# Patient Record
Sex: FEMALE | NOT HISPANIC OR LATINO | Employment: UNEMPLOYED | URBAN - METROPOLITAN AREA
[De-identification: names, ages, dates, MRNs, and addresses within clinical notes are randomized per-mention and may not be internally consistent; named-entity substitution may affect disease eponyms.]

---

## 2018-08-01 ENCOUNTER — TRANSCRIBE ORDERS (OUTPATIENT)
Dept: ADMINISTRATIVE | Facility: HOSPITAL | Age: 48
End: 2018-08-01

## 2018-08-01 DIAGNOSIS — R10.815 PERIUMBILIC ABDOMINAL TENDERNESS, REBOUND TENDERNESS PRESENCE NOT SPECIFIED: Primary | ICD-10-CM

## 2018-08-27 ENCOUNTER — HOSPITAL ENCOUNTER (OUTPATIENT)
Dept: CT IMAGING | Facility: HOSPITAL | Age: 48
Discharge: HOME | End: 2018-08-27
Attending: SURGERY | Admitting: SURGERY

## 2018-08-27 DIAGNOSIS — R10.815 PERIUMBILIC ABDOMINAL TENDERNESS, REBOUND TENDERNESS PRESENCE NOT SPECIFIED: ICD-10-CM

## 2018-08-27 PROCEDURE — 74176 CT ABD & PELVIS W/O CONTRAST: CPT

## 2019-03-13 ENCOUNTER — APPOINTMENT (OUTPATIENT)
Dept: PREADMISSION TESTING | Facility: HOSPITAL | Age: 49
End: 2019-03-13

## 2019-03-13 VITALS — BODY MASS INDEX: 29.41 KG/M2 | HEIGHT: 66 IN | WEIGHT: 182.98 LBS

## 2019-03-13 LAB
ANION GAP SERPL CALCULATED.3IONS-SCNC: 10 MMOL/L (ref 3–11)
BUN BLD-MCNC: 15 MG/DL (ref 9–23)
BUN/CREAT SERPL: 18.5 (ref 7–25)
CALCIUM SPEC-SCNC: 9.5 MG/DL (ref 8.7–10.4)
CEA SERPL-MCNC: 1 NG/ML (ref 0–2.5)
CHLORIDE SERPL-SCNC: 104 MMOL/L (ref 99–109)
CO2 SERPL-SCNC: 26 MMOL/L (ref 20–31)
CREAT BLD-MCNC: 0.81 MG/DL (ref 0.6–1.3)
DEPRECATED RDW RBC AUTO: 43.4 FL (ref 37–54)
ERYTHROCYTE [DISTWIDTH] IN BLOOD BY AUTOMATED COUNT: 12.4 % (ref 11.3–14.5)
GFR SERPL CREATININE-BSD FRML MDRD: 75 ML/MIN/1.73
GFR SERPL CREATININE-BSD FRML MDRD: 90 ML/MIN/1.73
GLUCOSE BLD-MCNC: 192 MG/DL (ref 70–100)
HCT VFR BLD AUTO: 43.7 % (ref 34.5–44)
HGB BLD-MCNC: 15.2 G/DL (ref 11.5–15.5)
MCH RBC QN AUTO: 33.2 PG (ref 27–31)
MCHC RBC AUTO-ENTMCNC: 34.8 G/DL (ref 32–36)
MCV RBC AUTO: 95.4 FL (ref 80–99)
PLATELET # BLD AUTO: 238 10*3/MM3 (ref 150–450)
PMV BLD AUTO: 10.3 FL (ref 6–12)
POTASSIUM BLD-SCNC: 5.2 MMOL/L (ref 3.5–5.5)
RBC # BLD AUTO: 4.58 10*6/MM3 (ref 3.89–5.14)
SODIUM BLD-SCNC: 140 MMOL/L (ref 132–146)
WBC NRBC COR # BLD: 7.63 10*3/MM3 (ref 3.5–10.8)

## 2019-03-13 PROCEDURE — 85027 COMPLETE CBC AUTOMATED: CPT | Performed by: SURGERY

## 2019-03-13 PROCEDURE — 82378 CARCINOEMBRYONIC ANTIGEN: CPT | Performed by: SURGERY

## 2019-03-13 PROCEDURE — 36415 COLL VENOUS BLD VENIPUNCTURE: CPT

## 2019-03-13 PROCEDURE — 80048 BASIC METABOLIC PNL TOTAL CA: CPT | Performed by: SURGERY

## 2019-03-13 RX ORDER — MELOXICAM 7.5 MG/1
15 TABLET ORAL ONCE
Status: CANCELLED | OUTPATIENT
Start: 2019-03-13 | End: 2019-03-13

## 2019-03-13 RX ORDER — ACETAMINOPHEN 500 MG
1000 TABLET ORAL ONCE
Status: CANCELLED | OUTPATIENT
Start: 2019-03-13 | End: 2019-03-13

## 2019-03-13 RX ORDER — PREGABALIN 75 MG/1
75 CAPSULE ORAL ONCE
Status: CANCELLED | OUTPATIENT
Start: 2019-03-13 | End: 2019-03-13

## 2019-03-13 RX ORDER — SCOLOPAMINE TRANSDERMAL SYSTEM 1 MG/1
1 PATCH, EXTENDED RELEASE TRANSDERMAL CONTINUOUS
Status: CANCELLED | OUTPATIENT
Start: 2019-03-13 | End: 2019-03-16

## 2019-03-13 NOTE — DISCHARGE INSTRUCTIONS
The following information and instructions were given:    Do not eat, drink, smoke or chew gum after midnight the night before surgery. This also includes no mints.  Take all routine, prescribed medications including heart and blood pressure medicines with a sip of water unless otherwise instructed by your physician.   Do NOT take diabetic medication unless instructed by your physician.    If you were instructed to drink 20 ounces (or until full) of Gatorade or G2 (if diabetic) the morning of surgery, the Gatorade or G2 must be completed 1 hour before arrival time on the day of surgery .  No RED Gatorade or G2.      DO NOT shave for two days before your procedure.  Do not wear makeup.      DO NOT wear fingernail polish (gel/regular) and/or acrylic/artificial nails on the day of surgery.   If you have had a recent manicure and would rather not remove polish or artificial nails, then the minimum requirement is that the polish/artificial nails must be removed from the middle finger on each hand.      If you are having surgery/procedure on an upper extremity, then fingernail polish/artificial fingernails must be removed for surgery.  NO EXCEPTIONS.      If you are having surgery/procedure on a lower extremity, then toenail polish on both extremities must be removed for surgery.  NO EXCEPTIONS.    Remove all jewelry (advise to go to jeweler if unable to remove).  Jewelry, especially rings, can no longer be taped for surgery.    Leave anything you consider valuable at home.    Leave your suitcase in the car until after your surgery.    Bring the following with you the day of your procedure (when applicable)       -picture ID and insurance cards       -Co-pay/deductible required by insurance       -Medications in the original bottles (not a list) including all over-the-counter medications if not brought to PAT       -Copy of advance directive, living will or power of  documents if not brought to PAT       -CPAP or  BIPAP mask and tubing (do not bring machine)       -Skin prep instruction(s) sheet       -PAT Pass    Education booklet, brochure, handout or binder related to procedure given to patient.    When applicable, an ERAS booklet was given to patient.    Pain Control After Surgery handout given to patient.    Respirex use (handout given to patient) and pneumonia prevention education provided.    Signs and Symptoms of infection discussed.    DVT Prevention education given.  Stressing the importance of ambulation.    Please apply Chlorhexadine wipes to surgical area (if instructed) the night before procedure and the AM of procedure and document date/time of applications on skin prep instruction sheet.        Per Anesthesia Request, patient instructed not to take their ACE/ARB medications on the AM of surgery.  Patient instructed to drink 20 ounces (or until full) of Gatorade or 20 ounces of G2 (if diabetic) and complete 1 hour before arrival time for procedure (NO RED Gatorade or G2)    Patient verbalized understanding.  Hernia information

## 2019-03-21 ENCOUNTER — ANESTHESIA EVENT (OUTPATIENT)
Dept: PERIOP | Facility: HOSPITAL | Age: 49
End: 2019-03-21

## 2019-03-21 ENCOUNTER — HOSPITAL ENCOUNTER (OUTPATIENT)
Facility: HOSPITAL | Age: 49
Discharge: HOME | End: 2019-03-24
Attending: SURGERY | Admitting: SURGERY

## 2019-03-21 ENCOUNTER — ANESTHESIA (OUTPATIENT)
Dept: PERIOP | Facility: HOSPITAL | Age: 49
End: 2019-03-21

## 2019-03-21 DIAGNOSIS — K43.9 VENTRAL HERNIA: ICD-10-CM

## 2019-03-21 PROBLEM — K43.2 RECURRENT INCISIONAL HERNIA: Status: ACTIVE | Noted: 2019-03-21

## 2019-03-21 LAB
GLUCOSE BLDC GLUCOMTR-MCNC: 219 MG/DL (ref 70–130)
GLUCOSE BLDC GLUCOMTR-MCNC: 238 MG/DL (ref 70–130)
HBA1C MFR BLD: 8 % (ref 4.8–5.6)

## 2019-03-21 PROCEDURE — G0378 HOSPITAL OBSERVATION PER HR: HCPCS

## 2019-03-21 PROCEDURE — C1781 MESH (IMPLANTABLE): HCPCS | Performed by: SURGERY

## 2019-03-21 PROCEDURE — 25010000002 NEOSTIGMINE 10 MG/10ML SOLUTION: Performed by: NURSE ANESTHETIST, CERTIFIED REGISTERED

## 2019-03-21 PROCEDURE — 88302 TISSUE EXAM BY PATHOLOGIST: CPT | Performed by: SURGERY

## 2019-03-21 PROCEDURE — 25010000002 PROPOFOL 1000 MG/ML EMULSION: Performed by: NURSE ANESTHETIST, CERTIFIED REGISTERED

## 2019-03-21 PROCEDURE — 25010000002 ONDANSETRON PER 1 MG: Performed by: NURSE ANESTHETIST, CERTIFIED REGISTERED

## 2019-03-21 PROCEDURE — 25010000002 PHENYLEPHRINE PER 1 ML: Performed by: NURSE ANESTHETIST, CERTIFIED REGISTERED

## 2019-03-21 PROCEDURE — 63710000001 INSULIN LISPRO (HUMAN) PER 5 UNITS: Performed by: ANESTHESIOLOGY

## 2019-03-21 PROCEDURE — 88304 TISSUE EXAM BY PATHOLOGIST: CPT | Performed by: SURGERY

## 2019-03-21 PROCEDURE — 63710000001 INSULIN LISPRO (HUMAN) PER 5 UNITS

## 2019-03-21 PROCEDURE — 25010000002 FENTANYL CITRATE (PF) 100 MCG/2ML SOLUTION: Performed by: NURSE ANESTHETIST, CERTIFIED REGISTERED

## 2019-03-21 PROCEDURE — 82962 GLUCOSE BLOOD TEST: CPT

## 2019-03-21 PROCEDURE — 25010000002 HYDROMORPHONE HCL PF 500 MG/50ML SOLUTION: Performed by: SURGERY

## 2019-03-21 PROCEDURE — 25010000002 PROPOFOL 10 MG/ML EMULSION: Performed by: NURSE ANESTHETIST, CERTIFIED REGISTERED

## 2019-03-21 PROCEDURE — 25010000002 PROMETHAZINE PER 50 MG: Performed by: NURSE ANESTHETIST, CERTIFIED REGISTERED

## 2019-03-21 PROCEDURE — 83036 HEMOGLOBIN GLYCOSYLATED A1C: CPT | Performed by: SURGERY

## 2019-03-21 PROCEDURE — 94799 UNLISTED PULMONARY SVC/PX: CPT

## 2019-03-21 PROCEDURE — 25010000003 CEFAZOLIN IN DEXTROSE 2-4 GM/100ML-% SOLUTION: Performed by: SURGERY

## 2019-03-21 DEVICE — VENTRALIGHT ST MESH
Type: IMPLANTABLE DEVICE | Site: ABDOMEN | Status: FUNCTIONAL
Brand: VENTRALIGHT ST

## 2019-03-21 RX ORDER — CEFAZOLIN SODIUM 2 G/100ML
2 INJECTION, SOLUTION INTRAVENOUS ONCE
Status: COMPLETED | OUTPATIENT
Start: 2019-03-21 | End: 2019-03-21

## 2019-03-21 RX ORDER — FENTANYL CITRATE 50 UG/ML
50 INJECTION, SOLUTION INTRAMUSCULAR; INTRAVENOUS
Status: DISCONTINUED | OUTPATIENT
Start: 2019-03-21 | End: 2019-03-21 | Stop reason: HOSPADM

## 2019-03-21 RX ORDER — MAGNESIUM HYDROXIDE 1200 MG/15ML
LIQUID ORAL AS NEEDED
Status: DISCONTINUED | OUTPATIENT
Start: 2019-03-21 | End: 2019-03-21 | Stop reason: HOSPADM

## 2019-03-21 RX ORDER — PROMETHAZINE HYDROCHLORIDE 25 MG/1
25 SUPPOSITORY RECTAL ONCE AS NEEDED
Status: COMPLETED | OUTPATIENT
Start: 2019-03-21 | End: 2019-03-21

## 2019-03-21 RX ORDER — SODIUM CHLORIDE 9 MG/ML
INJECTION, SOLUTION INTRAVENOUS CONTINUOUS PRN
Status: DISCONTINUED | OUTPATIENT
Start: 2019-03-21 | End: 2019-03-21 | Stop reason: SURG

## 2019-03-21 RX ORDER — LABETALOL HYDROCHLORIDE 5 MG/ML
5 INJECTION, SOLUTION INTRAVENOUS
Status: DISCONTINUED | OUTPATIENT
Start: 2019-03-21 | End: 2019-03-21 | Stop reason: HOSPADM

## 2019-03-21 RX ORDER — PROPOFOL 10 MG/ML
VIAL (ML) INTRAVENOUS AS NEEDED
Status: DISCONTINUED | OUTPATIENT
Start: 2019-03-21 | End: 2019-03-21 | Stop reason: SURG

## 2019-03-21 RX ORDER — MELOXICAM 7.5 MG/1
15 TABLET ORAL ONCE
Status: COMPLETED | OUTPATIENT
Start: 2019-03-21 | End: 2019-03-21

## 2019-03-21 RX ORDER — PROMETHAZINE HYDROCHLORIDE 25 MG/1
25 TABLET ORAL ONCE AS NEEDED
Status: COMPLETED | OUTPATIENT
Start: 2019-03-21 | End: 2019-03-21

## 2019-03-21 RX ORDER — ACETAMINOPHEN 500 MG
1000 TABLET ORAL ONCE
Status: COMPLETED | OUTPATIENT
Start: 2019-03-21 | End: 2019-03-21

## 2019-03-21 RX ORDER — NEOSTIGMINE METHYLSULFATE 1 MG/ML
INJECTION, SOLUTION INTRAVENOUS AS NEEDED
Status: DISCONTINUED | OUTPATIENT
Start: 2019-03-21 | End: 2019-03-21 | Stop reason: SURG

## 2019-03-21 RX ORDER — SCOLOPAMINE TRANSDERMAL SYSTEM 1 MG/1
1 PATCH, EXTENDED RELEASE TRANSDERMAL ONCE
Status: DISCONTINUED | OUTPATIENT
Start: 2019-03-21 | End: 2019-03-21

## 2019-03-21 RX ORDER — ONDANSETRON 2 MG/ML
4 INJECTION INTRAMUSCULAR; INTRAVENOUS ONCE AS NEEDED
Status: DISCONTINUED | OUTPATIENT
Start: 2019-03-21 | End: 2019-03-21 | Stop reason: HOSPADM

## 2019-03-21 RX ORDER — HYDROMORPHONE HYDROCHLORIDE 1 MG/ML
0.5 INJECTION, SOLUTION INTRAMUSCULAR; INTRAVENOUS; SUBCUTANEOUS
Status: DISCONTINUED | OUTPATIENT
Start: 2019-03-21 | End: 2019-03-21 | Stop reason: HOSPADM

## 2019-03-21 RX ORDER — MORPHINE SULFATE 4 MG/ML
2 INJECTION, SOLUTION INTRAMUSCULAR; INTRAVENOUS
Status: DISCONTINUED | OUTPATIENT
Start: 2019-03-21 | End: 2019-03-24 | Stop reason: HOSPADM

## 2019-03-21 RX ORDER — BUPIVACAINE HYDROCHLORIDE 2.5 MG/ML
INJECTION, SOLUTION EPIDURAL; INFILTRATION; INTRACAUDAL
Status: COMPLETED | OUTPATIENT
Start: 2019-03-21 | End: 2019-03-21

## 2019-03-21 RX ORDER — PROMETHAZINE HYDROCHLORIDE 25 MG/ML
6.25 INJECTION, SOLUTION INTRAMUSCULAR; INTRAVENOUS ONCE AS NEEDED
Status: COMPLETED | OUTPATIENT
Start: 2019-03-21 | End: 2019-03-21

## 2019-03-21 RX ORDER — EPHEDRINE SULFATE 50 MG/ML
5 INJECTION, SOLUTION INTRAVENOUS ONCE AS NEEDED
Status: DISCONTINUED | OUTPATIENT
Start: 2019-03-21 | End: 2019-03-21 | Stop reason: HOSPADM

## 2019-03-21 RX ORDER — LIDOCAINE HYDROCHLORIDE 10 MG/ML
0.5 INJECTION, SOLUTION EPIDURAL; INFILTRATION; INTRACAUDAL; PERINEURAL ONCE AS NEEDED
Status: COMPLETED | OUTPATIENT
Start: 2019-03-21 | End: 2019-03-21

## 2019-03-21 RX ORDER — SODIUM CHLORIDE, SODIUM LACTATE, POTASSIUM CHLORIDE, CALCIUM CHLORIDE 600; 310; 30; 20 MG/100ML; MG/100ML; MG/100ML; MG/100ML
150 INJECTION, SOLUTION INTRAVENOUS CONTINUOUS
Status: DISCONTINUED | OUTPATIENT
Start: 2019-03-21 | End: 2019-03-22

## 2019-03-21 RX ORDER — HYDRALAZINE HYDROCHLORIDE 20 MG/ML
5 INJECTION INTRAMUSCULAR; INTRAVENOUS
Status: DISCONTINUED | OUTPATIENT
Start: 2019-03-21 | End: 2019-03-21 | Stop reason: HOSPADM

## 2019-03-21 RX ORDER — NALOXONE HCL 0.4 MG/ML
0.4 VIAL (ML) INJECTION
Status: DISCONTINUED | OUTPATIENT
Start: 2019-03-21 | End: 2019-03-24 | Stop reason: HOSPADM

## 2019-03-21 RX ORDER — ONDANSETRON 2 MG/ML
INJECTION INTRAMUSCULAR; INTRAVENOUS AS NEEDED
Status: DISCONTINUED | OUTPATIENT
Start: 2019-03-21 | End: 2019-03-21 | Stop reason: SURG

## 2019-03-21 RX ORDER — SODIUM CHLORIDE 0.9 % (FLUSH) 0.9 %
3 SYRINGE (ML) INJECTION EVERY 12 HOURS SCHEDULED
Status: DISCONTINUED | OUTPATIENT
Start: 2019-03-21 | End: 2019-03-21 | Stop reason: HOSPADM

## 2019-03-21 RX ORDER — HEPARIN SODIUM 5000 [USP'U]/ML
5000 INJECTION, SOLUTION INTRAVENOUS; SUBCUTANEOUS EVERY 8 HOURS SCHEDULED
Status: DISCONTINUED | OUTPATIENT
Start: 2019-03-22 | End: 2019-03-24 | Stop reason: HOSPADM

## 2019-03-21 RX ORDER — NALOXONE HCL 0.4 MG/ML
0.1 VIAL (ML) INJECTION
Status: DISCONTINUED | OUTPATIENT
Start: 2019-03-21 | End: 2019-03-24 | Stop reason: HOSPADM

## 2019-03-21 RX ORDER — GLYCOPYRROLATE 0.2 MG/ML
INJECTION INTRAMUSCULAR; INTRAVENOUS AS NEEDED
Status: DISCONTINUED | OUTPATIENT
Start: 2019-03-21 | End: 2019-03-21 | Stop reason: SURG

## 2019-03-21 RX ORDER — FAMOTIDINE 20 MG/1
20 TABLET, FILM COATED ORAL
Status: COMPLETED | OUTPATIENT
Start: 2019-03-21 | End: 2019-03-21

## 2019-03-21 RX ORDER — SODIUM CHLORIDE 0.9 % (FLUSH) 0.9 %
3-10 SYRINGE (ML) INJECTION AS NEEDED
Status: DISCONTINUED | OUTPATIENT
Start: 2019-03-21 | End: 2019-03-21 | Stop reason: HOSPADM

## 2019-03-21 RX ORDER — PROMETHAZINE HYDROCHLORIDE 25 MG/ML
12.5 INJECTION, SOLUTION INTRAMUSCULAR; INTRAVENOUS
Status: DISCONTINUED | OUTPATIENT
Start: 2019-03-21 | End: 2019-03-24 | Stop reason: HOSPADM

## 2019-03-21 RX ORDER — DIPHENHYDRAMINE HYDROCHLORIDE 50 MG/ML
25 INJECTION INTRAMUSCULAR; INTRAVENOUS EVERY 6 HOURS PRN
Status: DISCONTINUED | OUTPATIENT
Start: 2019-03-21 | End: 2019-03-24 | Stop reason: HOSPADM

## 2019-03-21 RX ORDER — ATRACURIUM BESYLATE 10 MG/ML
INJECTION, SOLUTION INTRAVENOUS AS NEEDED
Status: DISCONTINUED | OUTPATIENT
Start: 2019-03-21 | End: 2019-03-21 | Stop reason: SURG

## 2019-03-21 RX ORDER — SODIUM CHLORIDE, SODIUM LACTATE, POTASSIUM CHLORIDE, CALCIUM CHLORIDE 600; 310; 30; 20 MG/100ML; MG/100ML; MG/100ML; MG/100ML
9 INJECTION, SOLUTION INTRAVENOUS CONTINUOUS PRN
Status: DISCONTINUED | OUTPATIENT
Start: 2019-03-21 | End: 2019-03-21

## 2019-03-21 RX ORDER — PREGABALIN 75 MG/1
75 CAPSULE ORAL ONCE
Status: COMPLETED | OUTPATIENT
Start: 2019-03-21 | End: 2019-03-21

## 2019-03-21 RX ORDER — LIDOCAINE HYDROCHLORIDE 10 MG/ML
INJECTION, SOLUTION EPIDURAL; INFILTRATION; INTRACAUDAL; PERINEURAL AS NEEDED
Status: DISCONTINUED | OUTPATIENT
Start: 2019-03-21 | End: 2019-03-21 | Stop reason: SURG

## 2019-03-21 RX ORDER — ALBUTEROL SULFATE 2.5 MG/3ML
2.5 SOLUTION RESPIRATORY (INHALATION) ONCE AS NEEDED
Status: DISCONTINUED | OUTPATIENT
Start: 2019-03-21 | End: 2019-03-21 | Stop reason: HOSPADM

## 2019-03-21 RX ORDER — DOCUSATE SODIUM 100 MG/1
100 CAPSULE, LIQUID FILLED ORAL 2 TIMES DAILY
Status: DISCONTINUED | OUTPATIENT
Start: 2019-03-21 | End: 2019-03-24 | Stop reason: HOSPADM

## 2019-03-21 RX ORDER — FENTANYL CITRATE 50 UG/ML
INJECTION, SOLUTION INTRAMUSCULAR; INTRAVENOUS AS NEEDED
Status: DISCONTINUED | OUTPATIENT
Start: 2019-03-21 | End: 2019-03-21 | Stop reason: SURG

## 2019-03-21 RX ADMIN — ACETAMINOPHEN 1000 MG: 500 TABLET ORAL at 07:05

## 2019-03-21 RX ADMIN — SODIUM CHLORIDE: 9 INJECTION, SOLUTION INTRAVENOUS at 09:24

## 2019-03-21 RX ADMIN — GLYCOPYRROLATE 0.4 MG: 0.2 INJECTION, SOLUTION INTRAMUSCULAR; INTRAVENOUS at 09:45

## 2019-03-21 RX ADMIN — CEFAZOLIN SODIUM 2 G: 2 INJECTION, SOLUTION INTRAVENOUS at 08:11

## 2019-03-21 RX ADMIN — ATRACURIUM BESYLATE 40 MG: 10 INJECTION, SOLUTION INTRAVENOUS at 08:07

## 2019-03-21 RX ADMIN — FENTANYL CITRATE 50 MCG: 50 INJECTION, SOLUTION INTRAMUSCULAR; INTRAVENOUS at 08:07

## 2019-03-21 RX ADMIN — FAMOTIDINE 20 MG: 20 TABLET ORAL at 07:05

## 2019-03-21 RX ADMIN — MELOXICAM 15 MG: 7.5 TABLET ORAL at 07:05

## 2019-03-21 RX ADMIN — EPHEDRINE SULFATE 10 MG: 50 INJECTION INTRAMUSCULAR; INTRAVENOUS; SUBCUTANEOUS at 08:54

## 2019-03-21 RX ADMIN — LIDOCAINE HYDROCHLORIDE 50 MG: 10 INJECTION, SOLUTION EPIDURAL; INFILTRATION; INTRACAUDAL; PERINEURAL at 08:07

## 2019-03-21 RX ADMIN — SODIUM CHLORIDE, POTASSIUM CHLORIDE, SODIUM LACTATE AND CALCIUM CHLORIDE 150 ML/HR: 600; 310; 30; 20 INJECTION, SOLUTION INTRAVENOUS at 16:04

## 2019-03-21 RX ADMIN — FENTANYL CITRATE 50 MCG: 50 INJECTION, SOLUTION INTRAMUSCULAR; INTRAVENOUS at 10:05

## 2019-03-21 RX ADMIN — LIDOCAINE HYDROCHLORIDE 0.2 ML: 10 INJECTION, SOLUTION EPIDURAL; INFILTRATION; INTRACAUDAL; PERINEURAL at 06:58

## 2019-03-21 RX ADMIN — EPHEDRINE SULFATE 10 MG: 50 INJECTION INTRAMUSCULAR; INTRAVENOUS; SUBCUTANEOUS at 08:13

## 2019-03-21 RX ADMIN — EPHEDRINE SULFATE 10 MG: 50 INJECTION INTRAMUSCULAR; INTRAVENOUS; SUBCUTANEOUS at 08:21

## 2019-03-21 RX ADMIN — PREGABALIN 75 MG: 75 CAPSULE ORAL at 07:05

## 2019-03-21 RX ADMIN — PROPOFOL 200 MG: 10 INJECTION, EMULSION INTRAVENOUS at 08:07

## 2019-03-21 RX ADMIN — EPHEDRINE SULFATE 10 MG: 50 INJECTION INTRAMUSCULAR; INTRAVENOUS; SUBCUTANEOUS at 08:27

## 2019-03-21 RX ADMIN — SODIUM CHLORIDE, POTASSIUM CHLORIDE, SODIUM LACTATE AND CALCIUM CHLORIDE 9 ML/HR: 600; 310; 30; 20 INJECTION, SOLUTION INTRAVENOUS at 06:58

## 2019-03-21 RX ADMIN — DOCUSATE SODIUM 100 MG: 100 CAPSULE, LIQUID FILLED ORAL at 20:27

## 2019-03-21 RX ADMIN — BUPIVACAINE HYDROCHLORIDE 60 ML: 2.5 INJECTION, SOLUTION EPIDURAL; INFILTRATION; INTRACAUDAL; PERINEURAL at 08:11

## 2019-03-21 RX ADMIN — ONDANSETRON 4 MG: 2 INJECTION INTRAMUSCULAR; INTRAVENOUS at 09:42

## 2019-03-21 RX ADMIN — PROPOFOL 25 MCG/KG/MIN: 10 INJECTION, EMULSION INTRAVENOUS at 08:11

## 2019-03-21 RX ADMIN — HYDROMORPHONE HYDROCHLORIDE: 10 INJECTION, SOLUTION INTRAMUSCULAR; INTRAVENOUS; SUBCUTANEOUS at 10:19

## 2019-03-21 RX ADMIN — INSULIN LISPRO 4 UNITS: 100 INJECTION, SOLUTION INTRAVENOUS; SUBCUTANEOUS at 10:51

## 2019-03-21 RX ADMIN — PHENYLEPHRINE HYDROCHLORIDE 80 MCG: 10 INJECTION INTRAVENOUS at 09:40

## 2019-03-21 RX ADMIN — PROMETHAZINE HYDROCHLORIDE 6.25 MG: 25 INJECTION INTRAMUSCULAR; INTRAVENOUS at 11:05

## 2019-03-21 RX ADMIN — ATRACURIUM BESYLATE 10 MG: 10 INJECTION, SOLUTION INTRAVENOUS at 09:04

## 2019-03-21 RX ADMIN — SCOPALAMINE 1 PATCH: 1 PATCH, EXTENDED RELEASE TRANSDERMAL at 07:05

## 2019-03-21 RX ADMIN — NEOSTIGMINE METHYLSULFATE 3 MG: 1 INJECTION, SOLUTION INTRAVENOUS at 09:45

## 2019-03-21 NOTE — H&P
"Pre-Op H&P  Angeles Viera  [id number]  1970    Chief complaint: Abdominal pain/bulge at umbilicus    HPI:    Patient is a 49 y.o.female who presents with history of open umbilical hernia repair with mesh with Dr. Florian Nov 2017.  Now has recurring abdominal pain with bulge at umbilicus with incisional hernia.  Here today for open incisional hernia repair with mesh.    Review of Systems:  General ROS: negative for chills, fever or skin lesions;  No changes since last office visit  Cardiovascular ROS: no chest pain or dyspnea on exertion  Respiratory ROS: no cough, shortness of breath, or wheezing    Allergies: No Known Allergies    Home Meds:    No current facility-administered medications on file prior to encounter.      No current outpatient medications on file prior to encounter.       PMH:   Past Medical History:   Diagnosis Date   • Abdominal hernia    • Diabetes mellitus (CMS/HCC)     pre diabetic, dieting controlled and using cpd oil     PSH:    Past Surgical History:   Procedure Laterality Date   • APPENDECTOMY     • COLONOSCOPY     • FOOT SURGERY Left    • HERNIA REPAIR      abdominal   • HYSTERECTOMY     • TONSILLECTOMY       Social History:   Tobacco:   Social History     Tobacco Use   Smoking Status Never Smoker   Smokeless Tobacco Never Used      Alcohol:     Social History     Substance and Sexual Activity   Alcohol Use Yes    Comment: occastional       Vitals:           BP (!) 154/103 (BP Location: Right arm, Patient Position: Lying)   Pulse 89   Temp 98.2 °F (36.8 °C) (Temporal)   Resp 18   Ht 167.6 cm (66\")   Wt 83 kg (182 lb 15.7 oz)   SpO2 94%   BMI 29.53 kg/m²     Physical Exam:  General Appearance:    Alert, cooperative, no distress, appears stated age   Head:    Normocephalic, without obvious abnormality, atraumatic   Lungs:     Clear to auscultation bilaterally, respirations unlabored    Heart:   Regular rate and rhythm, S1 and S2 normal, no murmur, rub    or gallop    Abdomen:  "   Soft, non-tender.  +bowel sounds.  +incisional hernia at umbilicus   Breast Exam:    deferred   Genitalia:    deferred   Extremities:   Extremities normal, atraumatic, no cyanosis or edema   Skin:   Skin color, texture, turgor normal, no rashes or lesions   Neurologic:   Grossly intact   Results Review  I reviewed the patient's new clinical results.    Cancer Staging (if applicable)  Cancer Patient: __ yes _x_no __unknown; If yes, clinical stage T:__ N:__M:__, stage group or __N/A    Impression: Incisional hernia s/p open umbilical hernia repair with mesh 2017    Plan: Open incisional hernia repair with mesh    Jackie Fairchild, APRN   3/21/2019   7:11 AM

## 2019-03-21 NOTE — ANESTHESIA POSTPROCEDURE EVALUATION
Patient: Angeles Viera    Procedure Summary     Date:  03/21/19 Room / Location:   [facility] /  [facility]    Anesthesia Start:  0801 Anesthesia Stop:      Procedure:  OPEN INCISIONAL HERNIA REPAIR WITH MESH, bilateral SEPERATION Of COMPONENTS (N/A Abdomen) Diagnosis:      Surgeon:  Daniel Cullen MD Provider:  Tj Gordon MD    Anesthesia Type:  general with block ASA Status:  2          Anesthesia Type: general with block  Last vitals  BP   112/66 (03/21/19 1001)   Temp   97.7 °F (36.5 °C) (03/21/19 1001)   Pulse   104 (03/21/19 1001)   Resp   18 (03/21/19 1001)     SpO2   93 % (03/21/19 1001)     Post Anesthesia Care and Evaluation    Patient location during evaluation: PACU  Patient participation: complete - patient participated  Level of consciousness: awake and alert  Pain score: 0  Pain management: adequate  Airway patency: patent  Anesthetic complications: No anesthetic complications  PONV Status: none  Cardiovascular status: hemodynamically stable and acceptable  Respiratory status: nonlabored ventilation, acceptable and nasal cannula  Hydration status: acceptable

## 2019-03-21 NOTE — ANESTHESIA PREPROCEDURE EVALUATION
Anesthesia Evaluation     Patient summary reviewed and Nursing notes reviewed   no history of anesthetic complications:  NPO Solid Status: > 8 hours  NPO Liquid Status: > 2 hours           Airway   Mallampati: I  TM distance: >3 FB  Neck ROM: full  No difficulty expected  Dental - normal exam     Pulmonary     breath sounds clear to auscultation  Cardiovascular   Exercise tolerance: good (4-7 METS)    Rhythm: regular  Rate: normal        Neuro/Psych  GI/Hepatic/Renal/Endo    (+) obesity,   diabetes mellitus type 2 well controlled,     Musculoskeletal     Abdominal   (+) obese,     Abdomen: soft.   Substance History      OB/GYN          Other   (+) arthritis                     Anesthesia Plan    ASA 2     general with block     intravenous induction   Anesthetic plan, all risks, benefits, and alternatives have been provided, discussed and informed consent has been obtained with: patient.    Plan discussed with CRNA.

## 2019-03-21 NOTE — BRIEF OP NOTE
VENTRAL/INCISIONAL HERNIA REPAIR  Progress Note    Angeles Viera  3/21/2019    Pre-op Diagnosis:   Recurrent incisional hernia       Post-Op Diagnosis Codes:   Same    Procedure/CPT® Codes:      Procedure(s):  OPEN INCISIONAL HERNIA REPAIR WITH MESH, bilateral SEPERATION Of COMPONENTS    Surgeon(s):  Daniel Cullen MD    Anesthesia: General with Block    Staff:   Circulator: Ginna Herman RN; Cintia Watson RN  Scrub Person: Mariam Campuzano RN  Nursing Assistant: Alethea Abebe    Estimated Blood Loss: 20 mL    Urine Voided: * No values recorded between 3/21/2019  8:00 AM and 3/21/2019  9:52 AM *    Specimens:                Specimens     ID Source Type Tests Collected By Collected At Frozen?      A Umbilicus Tissue · TISSUE PATHOLOGY EXAM   Daniel Cullen MD 3/21/19 0870 No     Description: UMBILICUS    B Abdominal Wall Tissue · TISSUE PATHOLOGY EXAM   Daniel Cullen MD 3/21/19 0806 No     Description: OLD MESH                Drains:   Closed/Suction Drain 1 Left Abdomen Bulb 10 Fr. (Active)       Closed/Suction Drain 2 Right Abdomen Bulb 10 Fr. (Active)       Findings:   1. Complex 5x5cm recurrent incisional hernia with displaced old mesh.  2. Repair with preperitoneal, retrofascial placement of 14x8 cm Bard Ventralight ST mesh, and fascia closed primarily over top.  3. Bilateral separation of component parts.    Complications: None      Daniel Cullen MD     Date: 3/21/2019  Time: 9:55 AM

## 2019-03-21 NOTE — PROGRESS NOTES
"Patient Name:  Angeles Viera  YOB: 1970  [id number]    Surgery Post - Operative Note    Date of visit: 3/21/2019    Subjective   Subjective: Feels OK, pain controlled. Voiding.       Objective     Objective:    /73 (BP Location: Right arm, Patient Position: Lying)   Pulse 104   Temp 97.8 °F (36.6 °C) (Oral)   Resp 16   Ht 167.6 cm (66\")   Wt 83 kg (182 lb 15.7 oz)   SpO2 93%   BMI 29.53 kg/m²     CV:  Rate  regular and rhythm  regular  L:  Clear  to auscultation bilaterally   ABD:  Soft, appropriately tender. Dressings  clean, dry and intact , BRIDGER's serosanguinous  EXT:  No cyanosis, clubbing or edema         Assessment/Plan     Assessment/ Plan: Doing well after Incisional hernia repair with mesh. Continue Pulmonary toilet    Hospital Problem List     Recurrent incisional hernia              Daniel Cullen MD  3/21/2019  6:22 PM    "

## 2019-03-21 NOTE — OP NOTE
OPERATIVE NOTE    Patient Name:  Angeles Viera  YOB: 1970  [id number]    Date of Surgery:  3/21/2019      PREOPERATIVE DIAGNOSIS: Recurrent incisional hernia      POSTOPERATIVE DIAGNOSIS: Same        PROCEDURE PERFORMED:   1. Open incisional hernia repair with mesh  2. Bilateral separation of components       SURGEON: Daniel Cullen MD       ASSISTANT: None        SPECIMENS:   1. Old umbilicus  2. Previous mesh       ANESTHESIA: General.        FINDINGS:   1. Complex 5x5cm recurrent incisional hernia with displaced old mesh.  2. Repair with preperitoneal, retrofascial placement of 14x8 cm Bard Ventralight ST mesh, and fascia closed primarily over top.  3. Bilateral separation of component parts.         INDICATIONS: The patient is a 49 y.o. female who presented with a recurrent incisional hernia.  She had a previous attempted repair with mesh.  The risks and benefits of open incisional hernia repair with mesh, bilateral separation of components were discussed at length with the patient and her family, and they agreed to proceed.       DESCRIPTION OF PROCEDURE:      After obtaining informed consent, the patient was taken to the operating room and placed in supine  position. After appropriate DVT and antibiotic prophylaxis, general anesthesia was induced. TAP blocks were placed by the anesthesia staff bilaterally to aid in post-op pain control . The abdomen  was prepped and draped in standard sterile fashion and covered with an Ioban dressing to prevent contact of mesh with the skin.  An elliptical incision encompassing the umbilicus was then made.  Electrocautery dissection was carried down to the subcutaneous tissues.  The umbilicus was resected and passed off as specimen.  Additional blunt and left cardia dissection was carried down to the hernia sac itself.  This was opened at its distal end and contain only some omental fat.  The fascia circumferentially was cleared.  This was a complex  "hernia with a \"Swiss cheese\" type surrounding the previously placed mesh which was displaced and is part of the hernia sac.  Using meticulous dissection, a preperitoneal plane was developed circumferentially.  The fascial edges were then cleared.  The hernia sac was opened further and the old mesh resected and passed off as specimen.  There was some omentum within the hernia sac which was reduced. At no point was bowel encountered. At this point, the hernia sac itself was closed using running Vicryl suture.  This completely sealed off the intra-abdominal contents from the preperitoneal space.  The preperitoneal space was then developed circumferentially around the fascial defect itself.    The fascial defect was 5 x 5 cm.  However, due to the patient's obesity and body habitus, it was felt that primary closure or simple mesh closure would be an adequate.  Therefore the decision was made to proceed with a bilateral separation of component parts.    Starting on the right side, the skin was elevated off of the fascia and carried laterally.  The aponeurosis of the external oblique musculature was then divided longitudinally lateral to the rectus muscle.  This extended cephalad and caudad.  It was further elevated and the separation of components was carried laterally out to the anterior superior iliac spine.  This allowed for full mobilization of the right side.    In similar fashion, the skin on the left side was elevated off of the fashion carried laterally.  The aponeurosis of the external with muscular tear was then divided longitudinally lateral to the rectus muscles on the left.  This separation was extended cephalad and caudad.  It was further elevated and separation components was carried laterally out to the anterior superior iliac spine on the left.  In this manner the left side was completely mobilized.    Given the 5 x 5 cm defect, a 14 x 8 cm piece of ventral light ST mesh was chosen.  It was placed in a " retro-fascial preperitoneal location, and affixed using trans-fascial sutures of 0 Vicryl.  This allowed for at least 4 cm of overlap in all directions.  The fascia was then closed anterior to this without tension using running looped PDS sutures.  This allowed for full closure of the hernia with mesh reinforcement.  The subcutaneous pockets were each irrigated with saline until clear, and there is no bleeding seen.  2 BRIDGER drains were placed in the subcutaneous spaces, one in the left, and one on the right.  There were brought out through separate stab incisions.  Each drain was secured using nylon sutures.    The midline wound was then closed using absorbable sutures in the deep layers and the skin was closed using skin staples.  The incision was dressed in standard sterile fashion, covered with dry sterile dressing.  The BRIDGER drains were placed to bulb suction, and dressed in standard sterile fashion as well.  An abdominal binder was placed.    All sponge and needle counts were correct times two at the completion of the procedure. The patient recovered from anesthesia, was extubated in the operating room  and was transported to the PACU  in stable condition.      Daniel Cullen MD  3/21/2019  10:04 AM      Please note that portions of this note were completed with a voice recognition program. Efforts were made to edit the dictations, but occasionally words are mistranscribed.

## 2019-03-21 NOTE — ANESTHESIA PROCEDURE NOTES
Airway  Urgency: elective    Airway not difficult    General Information and Staff    Patient location during procedure: OR  CRNA: Sherly Mariano CRNA    Indications and Patient Condition  Indications for airway management: airway protection    Preoxygenated: yes  MILS not maintained throughout  Mask difficulty assessment: 2 - vent by mask + OA or adjuvant +/- NMBA    Final Airway Details  Final airway type: endotracheal airway      Successful airway: ETT  Cuffed: yes   Successful intubation technique: direct laryngoscopy  Endotracheal tube insertion site: oral  Blade: Negrita  Blade size: 3  ETT size (mm): 7.0  Cormack-Lehane Classification: grade IIa - partial view of glottis  Placement verified by: chest auscultation and capnometry   Cuff volume (mL): 7  Measured from: lips  ETT to lips (cm): 23  Number of attempts at approach: 1    Additional Comments  Negative epigastric sounds, Breath sound equal bilaterally with symmetric chest rise and fall

## 2019-03-21 NOTE — ANESTHESIA PROCEDURE NOTES
Peripheral Block      Patient location during procedure: OR  Start time: 3/21/2019 8:05 AM  Stop time: 3/21/2019 8:09 AM  Reason for block: at surgeon's request and post-op pain management  Performed by  Anesthesiologist: Tj Gordon MD  Preanesthetic Checklist  Completed: patient identified, site marked, surgical consent, pre-op evaluation, timeout performed, IV checked, risks and benefits discussed and monitors and equipment checked  Prep:  Pt Position: supine  Sterile barriers:cap, gloves, sterile barriers and mask  Prep: ChloraPrep  Patient monitoring: blood pressure monitoring, continuous pulse oximetry and EKG  Procedure  Sedation:yes  Performed under: general  Guidance:ultrasound guided  Images:still images obtained    Laterality:Bilateral  Block Type:TAP  Injection Technique:single-shot  Needle Type:short-bevel and echogenic  Needle Gauge:20 G    Medications Used: bupivacaine PF (MARCAINE) 0.25 % injection, 60 mL  Medications  Comment:Block Injection:  LA dose divided between Right and Left block       Adjuncts:  Decadron 4mg PSF, Buprenex 0.3mg (Per total volume of LA)    Post Assessment  Injection Assessment: negative aspiration for heme, incremental injection and no paresthesia on injection  Patient Tolerance:comfortable throughout block  Complications:no  Additional Notes      Under Ultrasound guidance, a BBraun 4inch 360 degree needle was advanced with Normal Saline hydro dissection of tissue.  The Internal Oblique and Transversus Abdominus muscles where visualized.  At or before the aponeurosis of Internal Oblique, local anesthetic spread was visualized in the Transversus Abdominus Plane. Injection was made incrementally with aspiration every 5 mls.  There was no  intravascular injection,  injection pressure was normal, there was no neural injection, and the procedure was completed without difficulty.  Thank You.

## 2019-03-21 NOTE — PLAN OF CARE
Problem: Patient Care Overview  Goal: Plan of Care Review  Outcome: Ongoing (interventions implemented as appropriate)   03/21/19 1640   Coping/Psychosocial   Plan of Care Reviewed With patient   OTHER   Outcome Summary Patient arrived to the floor from the post op after an incisional hernia reapir. Denies having any pain upon arrival PCA pump in place. Educated patient on the use of her IS. Incision CDI with ABD pads and abdominal binder

## 2019-03-22 PROBLEM — E11.69 DIABETES MELLITUS TYPE 2 IN OBESE (HCC): Status: ACTIVE | Noted: 2019-03-22

## 2019-03-22 PROBLEM — F41.9 ANXIETY: Status: ACTIVE | Noted: 2019-03-22

## 2019-03-22 PROBLEM — E66.9 DIABETES MELLITUS TYPE 2 IN OBESE (HCC): Status: ACTIVE | Noted: 2019-03-22

## 2019-03-22 LAB
ANION GAP SERPL CALCULATED.3IONS-SCNC: 8 MMOL/L (ref 3–11)
BASOPHILS # BLD AUTO: 0.05 10*3/MM3 (ref 0–0.2)
BASOPHILS NFR BLD AUTO: 0.3 % (ref 0–1)
BUN BLD-MCNC: 15 MG/DL (ref 9–23)
BUN/CREAT SERPL: 21.7 (ref 7–25)
CALCIUM SPEC-SCNC: 8.9 MG/DL (ref 8.7–10.4)
CHLORIDE SERPL-SCNC: 104 MMOL/L (ref 99–109)
CO2 SERPL-SCNC: 24 MMOL/L (ref 20–31)
CREAT BLD-MCNC: 0.69 MG/DL (ref 0.6–1.3)
CYTO UR: NORMAL
DEPRECATED RDW RBC AUTO: 46.1 FL (ref 37–54)
EOSINOPHIL # BLD AUTO: 0.04 10*3/MM3 (ref 0–0.3)
EOSINOPHIL NFR BLD AUTO: 0.3 % (ref 0–3)
ERYTHROCYTE [DISTWIDTH] IN BLOOD BY AUTOMATED COUNT: 12.8 % (ref 11.3–14.5)
GFR SERPL CREATININE-BSD FRML MDRD: 109 ML/MIN/1.73
GFR SERPL CREATININE-BSD FRML MDRD: 90 ML/MIN/1.73
GLUCOSE BLD-MCNC: 169 MG/DL (ref 70–100)
GLUCOSE BLDC GLUCOMTR-MCNC: 177 MG/DL (ref 70–130)
GLUCOSE BLDC GLUCOMTR-MCNC: 187 MG/DL (ref 70–130)
GLUCOSE BLDC GLUCOMTR-MCNC: 201 MG/DL (ref 70–130)
GLUCOSE BLDC GLUCOMTR-MCNC: 230 MG/DL (ref 70–130)
HCT VFR BLD AUTO: 37.8 % (ref 34.5–44)
HGB BLD-MCNC: 12.4 G/DL (ref 11.5–15.5)
IMM GRANULOCYTES # BLD AUTO: 0.04 10*3/MM3 (ref 0–0.05)
IMM GRANULOCYTES NFR BLD AUTO: 0.3 % (ref 0–0.6)
LAB AP CASE REPORT: NORMAL
LAB AP CLINICAL INFORMATION: NORMAL
LYMPHOCYTES # BLD AUTO: 2.7 10*3/MM3 (ref 0.6–4.8)
LYMPHOCYTES NFR BLD AUTO: 18.8 % (ref 24–44)
MCH RBC QN AUTO: 32.4 PG (ref 27–31)
MCHC RBC AUTO-ENTMCNC: 32.8 G/DL (ref 32–36)
MCV RBC AUTO: 98.7 FL (ref 80–99)
MONOCYTES # BLD AUTO: 0.93 10*3/MM3 (ref 0–1)
MONOCYTES NFR BLD AUTO: 6.5 % (ref 0–12)
NEUTROPHILS # BLD AUTO: 10.62 10*3/MM3 (ref 1.5–8.3)
NEUTROPHILS NFR BLD AUTO: 74.1 % (ref 41–71)
PATH REPORT.FINAL DX SPEC: NORMAL
PATH REPORT.GROSS SPEC: NORMAL
PLATELET # BLD AUTO: 238 10*3/MM3 (ref 150–450)
PMV BLD AUTO: 10.5 FL (ref 6–12)
POTASSIUM BLD-SCNC: 4.6 MMOL/L (ref 3.5–5.5)
RBC # BLD AUTO: 3.83 10*6/MM3 (ref 3.89–5.14)
SODIUM BLD-SCNC: 136 MMOL/L (ref 132–146)
WBC NRBC COR # BLD: 14.34 10*3/MM3 (ref 3.5–10.8)

## 2019-03-22 PROCEDURE — G0108 DIAB MANAGE TRN  PER INDIV: HCPCS | Performed by: REGISTERED NURSE

## 2019-03-22 PROCEDURE — 25010000002 HEPARIN (PORCINE) PER 1000 UNITS: Performed by: SURGERY

## 2019-03-22 PROCEDURE — 25010000002 MORPHINE PER 10 MG: Performed by: SURGERY

## 2019-03-22 PROCEDURE — 85025 COMPLETE CBC W/AUTO DIFF WBC: CPT | Performed by: SURGERY

## 2019-03-22 PROCEDURE — 94799 UNLISTED PULMONARY SVC/PX: CPT

## 2019-03-22 PROCEDURE — 82962 GLUCOSE BLOOD TEST: CPT

## 2019-03-22 PROCEDURE — 80048 BASIC METABOLIC PNL TOTAL CA: CPT | Performed by: SURGERY

## 2019-03-22 PROCEDURE — 63710000001 INSULIN LISPRO (HUMAN) PER 5 UNITS: Performed by: SURGERY

## 2019-03-22 PROCEDURE — 94640 AIRWAY INHALATION TREATMENT: CPT

## 2019-03-22 PROCEDURE — G0378 HOSPITAL OBSERVATION PER HR: HCPCS

## 2019-03-22 RX ORDER — DEXTROSE, SODIUM CHLORIDE, AND POTASSIUM CHLORIDE 5; .45; .15 G/100ML; G/100ML; G/100ML
50 INJECTION INTRAVENOUS CONTINUOUS
Status: DISCONTINUED | OUTPATIENT
Start: 2019-03-22 | End: 2019-03-24 | Stop reason: HOSPADM

## 2019-03-22 RX ORDER — NICOTINE POLACRILEX 4 MG
15 LOZENGE BUCCAL
Status: DISCONTINUED | OUTPATIENT
Start: 2019-03-22 | End: 2019-03-24 | Stop reason: HOSPADM

## 2019-03-22 RX ORDER — ALPRAZOLAM 0.25 MG/1
0.25 TABLET ORAL 3 TIMES DAILY PRN
Status: DISCONTINUED | OUTPATIENT
Start: 2019-03-22 | End: 2019-03-24 | Stop reason: HOSPADM

## 2019-03-22 RX ORDER — DEXTROSE MONOHYDRATE 25 G/50ML
25 INJECTION, SOLUTION INTRAVENOUS
Status: DISCONTINUED | OUTPATIENT
Start: 2019-03-22 | End: 2019-03-24 | Stop reason: HOSPADM

## 2019-03-22 RX ORDER — ALBUTEROL SULFATE 2.5 MG/3ML
2.5 SOLUTION RESPIRATORY (INHALATION) 4 TIMES DAILY PRN
Status: DISCONTINUED | OUTPATIENT
Start: 2019-03-22 | End: 2019-03-24 | Stop reason: HOSPADM

## 2019-03-22 RX ADMIN — INSULIN LISPRO 3 UNITS: 100 INJECTION, SOLUTION INTRAVENOUS; SUBCUTANEOUS at 18:23

## 2019-03-22 RX ADMIN — INSULIN LISPRO 3 UNITS: 100 INJECTION, SOLUTION INTRAVENOUS; SUBCUTANEOUS at 20:32

## 2019-03-22 RX ADMIN — HEPARIN SODIUM 5000 UNITS: 5000 INJECTION, SOLUTION INTRAVENOUS; SUBCUTANEOUS at 21:58

## 2019-03-22 RX ADMIN — POTASSIUM CHLORIDE, DEXTROSE MONOHYDRATE AND SODIUM CHLORIDE 75 ML/HR: 150; 5; 450 INJECTION, SOLUTION INTRAVENOUS at 08:56

## 2019-03-22 RX ADMIN — INSULIN LISPRO 2 UNITS: 100 INJECTION, SOLUTION INTRAVENOUS; SUBCUTANEOUS at 13:33

## 2019-03-22 RX ADMIN — INSULIN LISPRO 2 UNITS: 100 INJECTION, SOLUTION INTRAVENOUS; SUBCUTANEOUS at 08:57

## 2019-03-22 RX ADMIN — POTASSIUM CHLORIDE, DEXTROSE MONOHYDRATE AND SODIUM CHLORIDE 75 ML/HR: 150; 5; 450 INJECTION, SOLUTION INTRAVENOUS at 22:13

## 2019-03-22 RX ADMIN — DOCUSATE SODIUM 100 MG: 100 CAPSULE, LIQUID FILLED ORAL at 20:32

## 2019-03-22 RX ADMIN — ALBUTEROL SULFATE 2.5 MG: 2.5 SOLUTION RESPIRATORY (INHALATION) at 17:29

## 2019-03-22 RX ADMIN — HEPARIN SODIUM 5000 UNITS: 5000 INJECTION, SOLUTION INTRAVENOUS; SUBCUTANEOUS at 13:33

## 2019-03-22 RX ADMIN — HEPARIN SODIUM 5000 UNITS: 5000 INJECTION, SOLUTION INTRAVENOUS; SUBCUTANEOUS at 05:18

## 2019-03-22 RX ADMIN — MORPHINE SULFATE 2 MG: 4 INJECTION INTRAVENOUS at 22:13

## 2019-03-22 RX ADMIN — DOCUSATE SODIUM 100 MG: 100 CAPSULE, LIQUID FILLED ORAL at 08:57

## 2019-03-22 NOTE — PLAN OF CARE
Problem: Patient Care Overview  Goal: Plan of Care Review  Outcome: Ongoing (interventions implemented as appropriate)   03/22/19 4424   Coping/Psychosocial   Plan of Care Reviewed With patient   OTHER   Outcome Summary pt ambulated in room; vss; dilaudid pca in use; BRIDGER drains set to suction; alert and oriented; tolerating po well; vss. abd binder still in place and dressing with small drainage; voiding well. xanax ordered prn for panic attacks-had one today but improved with ambulating in hallway.   Plan of Care Review   Progress improving     Goal: Individualization and Mutuality  Outcome: Ongoing (interventions implemented as appropriate)    Goal: Discharge Needs Assessment  Outcome: Ongoing (interventions implemented as appropriate)    Goal: Interprofessional Rounds/Family Conf  Outcome: Ongoing (interventions implemented as appropriate)      Problem: Surgery Nonspecified (Adult)  Goal: Signs and Symptoms of Listed Potential Problems Will be Absent, Minimized or Managed (Surgery Nonspecified)  Outcome: Ongoing (interventions implemented as appropriate)    Goal: Anesthesia/Sedation Recovery  Outcome: Ongoing (interventions implemented as appropriate)

## 2019-03-22 NOTE — PLAN OF CARE
Problem: Patient Care Overview  Goal: Plan of Care Review  Outcome: Ongoing (interventions implemented as appropriate)   03/22/19 0444   Coping/Psychosocial   Plan of Care Reviewed With patient;spouse   OTHER   Outcome Summary Pt VSS. Abd binder in place, coverderm dressings in place, one small area of shadow drainage. Pt has 2 BRIDGER drains to low wall suction, BRIDGER lines stripped per order. Pt splints with a pillow. Ambulates with 1,rw,gb. Pt has Dilaudid PCA pump for pain. Pt tolerated sandwich box. Rested well.    Plan of Care Review   Progress no change      03/22/19 0444   Coping/Psychosocial   Plan of Care Reviewed With patient;spouse   OTHER   Outcome Summary Pt VSS. Abd binder in place, coverderm dressings in place, one small area of shadow drainage. Pt has 2 BRIDGER drains to low wall suction, BRIDGER lines stripped per order. Pt splints with a pillow. Ambulates with 1,rw,gb. Pt has Dilaudid PCA pump for pain. Pt tolerated sandwich box. Rested well.    Plan of Care Review   Progress no change       Problem: Surgery Nonspecified (Adult)  Goal: Signs and Symptoms of Listed Potential Problems Will be Absent, Minimized or Managed (Surgery Nonspecified)  Outcome: Ongoing (interventions implemented as appropriate)    Goal: Anesthesia/Sedation Recovery  Outcome: Ongoing (interventions implemented as appropriate)

## 2019-03-22 NOTE — PROGRESS NOTES
"Patient Name:  Angeles Viera  YOB: 1970  [id number]    Surgery Progress Note    Date of visit: 3/22/2019    Subjective   Subjective: Feels sore, but tolerating PO.         Objective     Objective:     /66 (BP Location: Left arm, Patient Position: Lying)   Pulse 77   Temp 98.5 °F (36.9 °C) (Temporal)   Resp 14   Ht 167.6 cm (66\")   Wt 83 kg (182 lb 15.7 oz)   SpO2 94%   BMI 29.53 kg/m²     Intake/Output Summary (Last 24 hours) at 3/22/2019 0649  Last data filed at 3/22/2019 0346  Gross per 24 hour   Intake 1200 ml   Output 60 ml   Net 1140 ml       CV:  Rhythm  regular and rate regular   L:  Clear  to auscultation bilaterally   Abd:  Bowel sounds positive , soft, appropriately tender. Dressing c/d/i. BRIDGER's serosanguinous  Ext:  No cyanosis, clubbing, edema    Recent labs that are back at this time have been reviewed.        Assessment/Plan     Assessment/ Plan:    Hospital Problem List     Recurrent incisional hernia - Doing well after repair. Up in chair, increase mobility. Work on pain control.      DM - Sliding scale        Daniel Cullen MD  3/22/2019  6:49 AM      "

## 2019-03-22 NOTE — CONSULTS
"Diabetes Education  Assessment/Teaching    Patient Name:  Angeles Viera  YOB: 1970  MRN: [id number]  Admit Date:  3/21/2019      Assessment Date:  3/22/2019    Most Recent Value   General Information    Referral From:  A1c, Blood glucose, MD order   Height  167.6 cm (66\")   Height Method  Stated   Weight  83 kg (182 lb 15.7 oz)   Pregnancy Assessment   Diabetes History   What type of diabetes do you have?  Type 2   Length of Diabetes Diagnosis  1 - 5 years   Current DM knowledge  poor   Have you had diabetes education/teaching in the past?  no   Do you test your blood sugar at home?  yes   Frequency of checks  occ   Who performs the test?  self   Have you had low blood sugar? (<70mg/dl)  no   Have you had high blood sugar? (>140mg/dl)  yes   How often do you have high blood sugar?  -- [she was unaware of recommended glucose goals]   Education Preferences   Nutrition Information   When was the last time you saw a dietician?  never   Are you currently following a special meal plan?  -- [was just not eating anything white she said]   Assessment Topics   Healthy Eating - Assessment  Needs education   Being Active - Assessment  Needs education   Taking Medication - Assessment  Needs education   Problem Solving - Assessment  Needs education   Reducing Risk - Assessment  Needs education   Healthy Coping - Assessment  Needs education   Monitoring - Assessment  Needs education   DM Goals            Most Recent Value   DM Education Needs   Meter  Has own   Frequency of Testing  -- [urged her to test at least 3-5 x week]   Blood Glucose Target  -- [provided and discussed ADA diagnostic criteria and glucose and A1C suggested goals]   Medication  Oral [discussed oral diabetes meds available]   Problem Solving  Hypoglycemia, Hyperglycemia, Sick days, Signs, Symptoms, Treatment, Other (comment) [wound healing]   Physical Activity  None   Physical Activity Frequency  Discussed exercise importance   Healthy Coping "  Appropriate   Discharge Plan  Home   Motivation  Engaged, Strong   Teaching Method  Explanation   Patient Response  Verbalized understanding            Other Comments:   Discussed and taught Angeles about type 2 diabetes self-management, risk factors, and importance of blood glucose control to reduce complications and diagnostic criteria. Target blood glucose readings and A1c goals per ADA were reviewed. Reviewed with patient current A1c and discussed its significance. Signs, symptoms and treatment of hyperglycemia and hypoglycemia were discussed. Lifestyle changes such as physical activity with MD approval and healthy eating were encouraged. Stressed the importance of strict blood sugar control after surgery to prevent complications such as infection and to promote healing of incision.  Pt instructed to  check blood sugar at least twice per day and to call PCP if  Blood glucose is higher than 180 two times or more.  Patient was encouraged to keep record of blood glucose readings to take to follow up appointment with PCP.  She said she has known her glucoses have been elevated for several years and did at one time take metformin. She stopped the metformin due to GI upset and switched herself to cannabis oil for her glucoses. Provided handout of oral diabetes medications and urged her to discuss with her PCP. She said she was instructed to not eat anything white for her meal plan and explained the color of food has nothing to do with it; a diabetes diet is not temporary and is lowfat, high fiber and consistent CHO.  Pt was offered a free op follow up appointment however declined at this time.  Provided the OP program brochure and urged her to consider.        Electronically signed by:  Dena Rollins RN  03/22/19 1:56 PM

## 2019-03-22 NOTE — PROGRESS NOTES
Discharge Planning Assessment  [facility]     Patient Name: Angeles Veira  MRN: [id number]  Today's Date: 3/22/2019    Admit Date: 3/21/2019    Discharge Needs Assessment     Row Name 03/22/19 1404       Living Environment    Lives With  spouse    Current Living Arrangements  home/apartment/condo    Primary Care Provided by  self    Provides Primary Care For  no one    Family Caregiver if Needed  spouse    Able to Return to Prior Arrangements  yes       Transition Planning    Patient/Family Anticipates Transition to  home    Transportation Anticipated  family or friend will provide       Discharge Needs Assessment    Equipment Currently Used at Home  none        Discharge Plan     Row Name 03/22/19 1404       Plan    Plan  Home    Patient/Family in Agreement with Plan  yes    Plan Comments  Spoke with patient and  at bedside. They live in a two story in [facility]. PTA she was independent with ADL's and mobility. She denies any DME/HH needs at this time. Plan is home. CM following.     Final Discharge Disposition Code  01 - home or self-care        Destination      No service coordination in this encounter.      Durable Medical Equipment      No service coordination in this encounter.      Dialysis/Infusion      No service coordination in this encounter.      Home Medical Care      No service coordination in this encounter.      Community Resources      No service coordination in this encounter.          Demographic Summary     Row Name 03/22/19 1403       General Information    Arrived From  home    Reason for Consult  discharge planning        Functional Status     Row Name 03/22/19 1404       Functional Status    Usual Activity Tolerance  good    Current Activity Tolerance  good        Psychosocial    No documentation.       Abuse/Neglect    No documentation.       Legal    No documentation.       Substance Abuse    No documentation.       Patient Forms    No documentation.           Sneha  Luis RN

## 2019-03-23 LAB
ANION GAP SERPL CALCULATED.3IONS-SCNC: 8 MMOL/L (ref 3–11)
BUN BLD-MCNC: 8 MG/DL (ref 9–23)
BUN/CREAT SERPL: 12.5 (ref 7–25)
CALCIUM SPEC-SCNC: 8.6 MG/DL (ref 8.7–10.4)
CHLORIDE SERPL-SCNC: 105 MMOL/L (ref 99–109)
CO2 SERPL-SCNC: 26 MMOL/L (ref 20–31)
CREAT BLD-MCNC: 0.64 MG/DL (ref 0.6–1.3)
DEPRECATED RDW RBC AUTO: 46.5 FL (ref 37–54)
ERYTHROCYTE [DISTWIDTH] IN BLOOD BY AUTOMATED COUNT: 13 % (ref 11.3–14.5)
GFR SERPL CREATININE-BSD FRML MDRD: 119 ML/MIN/1.73
GFR SERPL CREATININE-BSD FRML MDRD: 98 ML/MIN/1.73
GLUCOSE BLD-MCNC: 200 MG/DL (ref 70–100)
GLUCOSE BLDC GLUCOMTR-MCNC: 153 MG/DL (ref 70–130)
GLUCOSE BLDC GLUCOMTR-MCNC: 158 MG/DL (ref 70–130)
GLUCOSE BLDC GLUCOMTR-MCNC: 163 MG/DL (ref 70–130)
GLUCOSE BLDC GLUCOMTR-MCNC: 188 MG/DL (ref 70–130)
GLUCOSE BLDC GLUCOMTR-MCNC: 194 MG/DL (ref 70–130)
HCT VFR BLD AUTO: 37.4 % (ref 34.5–44)
HGB BLD-MCNC: 12.3 G/DL (ref 11.5–15.5)
MCH RBC QN AUTO: 32.5 PG (ref 27–31)
MCHC RBC AUTO-ENTMCNC: 32.9 G/DL (ref 32–36)
MCV RBC AUTO: 98.9 FL (ref 80–99)
PLATELET # BLD AUTO: 228 10*3/MM3 (ref 150–450)
PMV BLD AUTO: 10.7 FL (ref 6–12)
POTASSIUM BLD-SCNC: 4.1 MMOL/L (ref 3.5–5.5)
RBC # BLD AUTO: 3.78 10*6/MM3 (ref 3.89–5.14)
SODIUM BLD-SCNC: 139 MMOL/L (ref 132–146)
WBC NRBC COR # BLD: 13.26 10*3/MM3 (ref 3.5–10.8)

## 2019-03-23 PROCEDURE — 25010000002 METOCLOPRAMIDE PER 10 MG: Performed by: SURGERY

## 2019-03-23 PROCEDURE — 82962 GLUCOSE BLOOD TEST: CPT

## 2019-03-23 PROCEDURE — 25010000002 HEPARIN (PORCINE) PER 1000 UNITS: Performed by: SURGERY

## 2019-03-23 PROCEDURE — G0378 HOSPITAL OBSERVATION PER HR: HCPCS

## 2019-03-23 PROCEDURE — 63710000001 INSULIN LISPRO (HUMAN) PER 5 UNITS: Performed by: SURGERY

## 2019-03-23 PROCEDURE — 94799 UNLISTED PULMONARY SVC/PX: CPT

## 2019-03-23 PROCEDURE — 80048 BASIC METABOLIC PNL TOTAL CA: CPT | Performed by: SURGERY

## 2019-03-23 PROCEDURE — 85027 COMPLETE CBC AUTOMATED: CPT | Performed by: SURGERY

## 2019-03-23 RX ORDER — OXYCODONE AND ACETAMINOPHEN 10; 325 MG/1; MG/1
1 TABLET ORAL EVERY 4 HOURS PRN
Status: DISCONTINUED | OUTPATIENT
Start: 2019-03-23 | End: 2019-03-24 | Stop reason: HOSPADM

## 2019-03-23 RX ORDER — METOCLOPRAMIDE HYDROCHLORIDE 5 MG/ML
10 INJECTION INTRAMUSCULAR; INTRAVENOUS EVERY 6 HOURS
Status: DISCONTINUED | OUTPATIENT
Start: 2019-03-23 | End: 2019-03-24 | Stop reason: HOSPADM

## 2019-03-23 RX ORDER — OXYCODONE HYDROCHLORIDE AND ACETAMINOPHEN 5; 325 MG/1; MG/1
1 TABLET ORAL EVERY 4 HOURS PRN
Status: DISCONTINUED | OUTPATIENT
Start: 2019-03-23 | End: 2019-03-24 | Stop reason: HOSPADM

## 2019-03-23 RX ORDER — BISACODYL 10 MG
10 SUPPOSITORY, RECTAL RECTAL EVERY 8 HOURS
Status: DISCONTINUED | OUTPATIENT
Start: 2019-03-23 | End: 2019-03-24 | Stop reason: HOSPADM

## 2019-03-23 RX ADMIN — Medication 10 MG: at 18:02

## 2019-03-23 RX ADMIN — METOCLOPRAMIDE 10 MG: 5 INJECTION, SOLUTION INTRAMUSCULAR; INTRAVENOUS at 16:42

## 2019-03-23 RX ADMIN — HEPARIN SODIUM 5000 UNITS: 5000 INJECTION, SOLUTION INTRAVENOUS; SUBCUTANEOUS at 21:16

## 2019-03-23 RX ADMIN — OXYCODONE AND ACETAMINOPHEN 1 TABLET: 5; 325 TABLET ORAL at 21:16

## 2019-03-23 RX ADMIN — POTASSIUM CHLORIDE, DEXTROSE MONOHYDRATE AND SODIUM CHLORIDE 50 ML/HR: 150; 5; 450 INJECTION, SOLUTION INTRAVENOUS at 18:18

## 2019-03-23 RX ADMIN — OXYCODONE AND ACETAMINOPHEN 1 TABLET: 5; 325 TABLET ORAL at 14:29

## 2019-03-23 RX ADMIN — SODIUM CHLORIDE, POTASSIUM CHLORIDE, SODIUM LACTATE AND CALCIUM CHLORIDE 1000 ML: 600; 310; 30; 20 INJECTION, SOLUTION INTRAVENOUS at 03:12

## 2019-03-23 RX ADMIN — Medication 10 MG: at 11:03

## 2019-03-23 RX ADMIN — METOCLOPRAMIDE 10 MG: 5 INJECTION, SOLUTION INTRAMUSCULAR; INTRAVENOUS at 11:03

## 2019-03-23 RX ADMIN — METOCLOPRAMIDE 10 MG: 5 INJECTION, SOLUTION INTRAMUSCULAR; INTRAVENOUS at 21:16

## 2019-03-23 RX ADMIN — INSULIN LISPRO 2 UNITS: 100 INJECTION, SOLUTION INTRAVENOUS; SUBCUTANEOUS at 18:02

## 2019-03-23 RX ADMIN — INSULIN LISPRO 2 UNITS: 100 INJECTION, SOLUTION INTRAVENOUS; SUBCUTANEOUS at 21:16

## 2019-03-23 RX ADMIN — HEPARIN SODIUM 5000 UNITS: 5000 INJECTION, SOLUTION INTRAVENOUS; SUBCUTANEOUS at 05:23

## 2019-03-23 RX ADMIN — INSULIN LISPRO 2 UNITS: 100 INJECTION, SOLUTION INTRAVENOUS; SUBCUTANEOUS at 08:31

## 2019-03-23 RX ADMIN — OXYCODONE AND ACETAMINOPHEN 1 TABLET: 5; 325 TABLET ORAL at 08:31

## 2019-03-23 RX ADMIN — OXYCODONE AND ACETAMINOPHEN 1 TABLET: 5; 325 TABLET ORAL at 03:11

## 2019-03-23 RX ADMIN — DOCUSATE SODIUM 100 MG: 100 CAPSULE, LIQUID FILLED ORAL at 08:31

## 2019-03-23 RX ADMIN — HEPARIN SODIUM 5000 UNITS: 5000 INJECTION, SOLUTION INTRAVENOUS; SUBCUTANEOUS at 14:29

## 2019-03-23 RX ADMIN — INSULIN LISPRO 2 UNITS: 100 INJECTION, SOLUTION INTRAVENOUS; SUBCUTANEOUS at 12:32

## 2019-03-23 NOTE — PLAN OF CARE
Problem: Patient Care Overview  Goal: Plan of Care Review  Outcome: Ongoing (interventions implemented as appropriate)   03/23/19 5920   Coping/Psychosocial   Plan of Care Reviewed With patient   OTHER   Outcome Summary vss-temp is lowering; IS encouraged; HR in low 100's; dressing changed on incision; had a small, small bowel movement; pain managed with percocet; alert and oriented. up in chair for a while during the day.   Plan of Care Review   Progress improving     Goal: Individualization and Mutuality  Outcome: Ongoing (interventions implemented as appropriate)    Goal: Discharge Needs Assessment  Outcome: Ongoing (interventions implemented as appropriate)    Goal: Interprofessional Rounds/Family Conf  Outcome: Ongoing (interventions implemented as appropriate)      Problem: Surgery Nonspecified (Adult)  Goal: Signs and Symptoms of Listed Potential Problems Will be Absent, Minimized or Managed (Surgery Nonspecified)  Outcome: Ongoing (interventions implemented as appropriate)    Goal: Anesthesia/Sedation Recovery  Outcome: Ongoing (interventions implemented as appropriate)

## 2019-03-23 NOTE — PROGRESS NOTES
"Patient Name:  Angeles Viera  YOB: 1970  [id number]    Surgery Progress Note    Date of visit: 3/23/2019    Subjective   Subjective: Feeling a bit better. Pain control better, voiding, tolerating PO. No BM yet, but has a history of chronic constipation.         Objective     Objective:     /79 (BP Location: Left arm, Patient Position: Lying)   Pulse 104   Temp 99.1 °F (37.3 °C) (Oral)   Resp 20   Ht 167.6 cm (66\")   Wt 83 kg (182 lb 15.7 oz)   SpO2 90%   BMI 29.53 kg/m²     Intake/Output Summary (Last 24 hours) at 3/23/2019 1000  Last data filed at 3/23/2019 0837  Gross per 24 hour   Intake 2069 ml   Output --   Net 2069 ml       CV:  Rhythm  regular and rate regular   L:  Clear  to auscultation bilaterally   Abd:  Bowel sounds positive , soft, appropriately tender. Incision c/d/i. BRIDGER's serous  Ext:  No cyanosis, clubbing, edema    Recent labs that are back at this time have been reviewed.        Assessment/Plan     Assessment/ Plan:    Hospital Problem List     Recurrent incisional hernia - Doing well after repair. D/C PCA, increase mobility. Add more bowel regimen. Follow drain output, hopefully home in 1-2 days.    Anxiety    Diabetes mellitus type 2 in obese (CMS/[facility])                  Daniel Cullen MD  3/23/2019  10:00 AM      "

## 2019-03-23 NOTE — PLAN OF CARE
Problem: Patient Care Overview  Goal: Plan of Care Review  Outcome: Ongoing (interventions implemented as appropriate)   03/23/19 0510   Coping/Psychosocial   Plan of Care Reviewed With patient;spouse   OTHER   Outcome Summary Pt VSS. Pt HR and temp elevated. Dr. Cullen notified and bolus given and Percocet added for pain. Pt c/o pain in abdomen PCA pump and PO meds seem to help some. BRIDGER drains in place to low wall suction. Abd binder in place. Dressins intact. Pt has hypoactive bowel sounds and has not passed gas, pt states she feels like she could if she bears down but she does not want to r/t pain. Pt splints abdomen with a pillow. Ambulates well to bathroom. Cont. to monitor.    Plan of Care Review   Progress no change       Problem: Surgery Nonspecified (Adult)  Goal: Signs and Symptoms of Listed Potential Problems Will be Absent, Minimized or Managed (Surgery Nonspecified)  Outcome: Ongoing (interventions implemented as appropriate)    Goal: Anesthesia/Sedation Recovery  Outcome: Ongoing (interventions implemented as appropriate)

## 2019-03-24 VITALS
RESPIRATION RATE: 16 BRPM | WEIGHT: 182.98 LBS | DIASTOLIC BLOOD PRESSURE: 75 MMHG | BODY MASS INDEX: 29.41 KG/M2 | SYSTOLIC BLOOD PRESSURE: 124 MMHG | OXYGEN SATURATION: 94 % | HEART RATE: 96 BPM | HEIGHT: 66 IN | TEMPERATURE: 98.6 F

## 2019-03-24 LAB
ANION GAP SERPL CALCULATED.3IONS-SCNC: 7 MMOL/L (ref 3–11)
BUN BLD-MCNC: 7 MG/DL (ref 9–23)
BUN/CREAT SERPL: 12.1 (ref 7–25)
CALCIUM SPEC-SCNC: 8.6 MG/DL (ref 8.7–10.4)
CHLORIDE SERPL-SCNC: 105 MMOL/L (ref 99–109)
CO2 SERPL-SCNC: 26 MMOL/L (ref 20–31)
CREAT BLD-MCNC: 0.58 MG/DL (ref 0.6–1.3)
DEPRECATED RDW RBC AUTO: 45.1 FL (ref 37–54)
ERYTHROCYTE [DISTWIDTH] IN BLOOD BY AUTOMATED COUNT: 12.6 % (ref 11.3–14.5)
GFR SERPL CREATININE-BSD FRML MDRD: 110 ML/MIN/1.73
GFR SERPL CREATININE-BSD FRML MDRD: 133 ML/MIN/1.73
GLUCOSE BLD-MCNC: 160 MG/DL (ref 70–100)
GLUCOSE BLDC GLUCOMTR-MCNC: 154 MG/DL (ref 70–130)
HCT VFR BLD AUTO: 36.3 % (ref 34.5–44)
HGB BLD-MCNC: 12 G/DL (ref 11.5–15.5)
MCH RBC QN AUTO: 32.5 PG (ref 27–31)
MCHC RBC AUTO-ENTMCNC: 33.1 G/DL (ref 32–36)
MCV RBC AUTO: 98.4 FL (ref 80–99)
PLATELET # BLD AUTO: 211 10*3/MM3 (ref 150–450)
PMV BLD AUTO: 10.6 FL (ref 6–12)
POTASSIUM BLD-SCNC: 3.6 MMOL/L (ref 3.5–5.5)
RBC # BLD AUTO: 3.69 10*6/MM3 (ref 3.89–5.14)
SODIUM BLD-SCNC: 138 MMOL/L (ref 132–146)
WBC NRBC COR # BLD: 9.49 10*3/MM3 (ref 3.5–10.8)

## 2019-03-24 PROCEDURE — 85027 COMPLETE CBC AUTOMATED: CPT | Performed by: SURGERY

## 2019-03-24 PROCEDURE — G0378 HOSPITAL OBSERVATION PER HR: HCPCS

## 2019-03-24 PROCEDURE — 94799 UNLISTED PULMONARY SVC/PX: CPT

## 2019-03-24 PROCEDURE — 25010000002 HEPARIN (PORCINE) PER 1000 UNITS: Performed by: SURGERY

## 2019-03-24 PROCEDURE — 80048 BASIC METABOLIC PNL TOTAL CA: CPT | Performed by: SURGERY

## 2019-03-24 PROCEDURE — 25010000002 METOCLOPRAMIDE PER 10 MG: Performed by: SURGERY

## 2019-03-24 PROCEDURE — 82962 GLUCOSE BLOOD TEST: CPT

## 2019-03-24 RX ORDER — PSEUDOEPHEDRINE HCL 30 MG
100 TABLET ORAL 2 TIMES DAILY
Qty: 20 EACH | Refills: 0 | Status: SHIPPED | OUTPATIENT
Start: 2019-03-24

## 2019-03-24 RX ORDER — OXYCODONE HYDROCHLORIDE AND ACETAMINOPHEN 5; 325 MG/1; MG/1
1 TABLET ORAL EVERY 4 HOURS PRN
Qty: 21 TABLET | Refills: 0 | Status: SHIPPED | OUTPATIENT
Start: 2019-03-24 | End: 2019-04-02

## 2019-03-24 RX ADMIN — OXYCODONE AND ACETAMINOPHEN 1 TABLET: 5; 325 TABLET ORAL at 02:41

## 2019-03-24 RX ADMIN — METOCLOPRAMIDE 10 MG: 5 INJECTION, SOLUTION INTRAMUSCULAR; INTRAVENOUS at 05:26

## 2019-03-24 RX ADMIN — HEPARIN SODIUM 5000 UNITS: 5000 INJECTION, SOLUTION INTRAVENOUS; SUBCUTANEOUS at 05:26

## 2019-03-24 RX ADMIN — OXYCODONE AND ACETAMINOPHEN 1 TABLET: 5; 325 TABLET ORAL at 08:16

## 2019-03-24 RX ADMIN — DOCUSATE SODIUM 100 MG: 100 CAPSULE, LIQUID FILLED ORAL at 08:16

## 2019-03-24 RX ADMIN — INSULIN LISPRO 2 UNITS: 100 INJECTION, SOLUTION INTRAVENOUS; SUBCUTANEOUS at 08:16

## 2019-03-24 NOTE — PROGRESS NOTES
"Patient Name:  Angeles Viera  YOB: 1970  [id number]    Surgery Progress Note    Date of visit: 3/24/2019    Subjective   Subjective: Feeling much better, tolerating diet, having BM's. Pain better controlled. Wants to go home.         Objective     Objective:     /75 (BP Location: Left arm, Patient Position: Lying)   Pulse 96   Temp 98.6 °F (37 °C) (Oral)   Resp 16   Ht 167.6 cm (66\")   Wt 83 kg (182 lb 15.7 oz)   SpO2 94%   BMI 29.53 kg/m²     Intake/Output Summary (Last 24 hours) at 3/24/2019 1202  Last data filed at 3/24/2019 0610  Gross per 24 hour   Intake 350 ml   Output 20 ml   Net 330 ml       CV:  Rhythm  regular and rate regular   L:  Clear  to auscultation bilaterally   Abd:  Bowel sounds positive , soft, incision c/d/i, BRIDGER's serous, scant  Ext:  No cyanosis, clubbing, edema    Recent labs that are back at this time have been reviewed.        Assessment/Plan     Assessment/ Plan:    Hospital Problem List     Recurrent incisional hernia - Doing well after repair. D/C BRIDGER's, D/C home. RTC 2 weeks.    Anxiety    Diabetes mellitus type 2 in obese (CMS/HCC)                  Daniel Cullen MD  3/24/2019  12:02 PM      "

## 2019-03-24 NOTE — PLAN OF CARE
Problem: Patient Care Overview  Goal: Plan of Care Review  Outcome: Ongoing (interventions implemented as appropriate)   03/24/19 1467   Coping/Psychosocial   Plan of Care Reviewed With patient   OTHER   Outcome Summary Pt VSS. Pain has been controlled with PO pain medication. Abdomen dressing CDI, abdominal binder in place. BRIDGER drains to individual suction, drainage documented. Teaching done on emptying drains and stripping the tubing. Pt voiced understanding. Rested well.    Plan of Care Review   Progress improving       Problem: Surgery Nonspecified (Adult)  Goal: Signs and Symptoms of Listed Potential Problems Will be Absent, Minimized or Managed (Surgery Nonspecified)  Outcome: Ongoing (interventions implemented as appropriate)    Goal: Anesthesia/Sedation Recovery  Outcome: Ongoing (interventions implemented as appropriate)

## 2019-03-24 NOTE — DISCHARGE SUMMARY
Discharge Summary    Patient Name:  Angeles Viera  YOB: 1970  [id number]      DATE OF ADMISSION: 3/21/2019    DATE OF DISCHARGE: 3/24/2019       FINAL DIAGNOSES:   Patient Active Problem List   Diagnosis   • Recurrent incisional hernia   • Anxiety   • Diabetes mellitus type 2 in obese (CMS/[facility])       CONSULTING SERVICES: None      PROCEDURES PERFORMED:   1.  Open incisional hernia repair with mesh, bilateral separation of components performed on 3/21/2019    HISTORY: The patient is a very pleasant 49 y.o. female with a history of recurrent incisional hernia.  After complete preoperative workup she was deemed an operative candidate.  The risks and benefits were discussed at length with the patient and her family, and she agreed to proceed.      HOSPITAL COURSE: The patient came in as an outpatient, underwent her operative procedure, was transferred to the floor.  Postoperatively she did well.  Her pain was initially controlled on IV PCA, and then transition to oral pain medication.  She gradually improved and had return of bowel function.  Her urinary status continued to improve, her laboratory exam remained stable and normal, and she was ready for discharge home on 3/24/2019.     CONDITION: At the time of discharge, the patient is tolerating a consistent carbohydrate diet without difficulty. she is having normal bowel and bladder function. her incisions are clean, dry and intact.      DISCHARGE MEDICATIONS:   1. All previous home medications.   2. Percocet  5 - 10 mg PO  Q4h  PRN pain  3. Colace 100mg PO BID       DISCHARGE INSTRUCTIONS:  1. The patient is instructed to follow up with Dr. Cullen in 2 weeks’ time.  2. she is instructed to refrain from lifting more than 15 or 20 pounds until their return to clinic.   3. If the patient has worsening problems with fever or chills nausea or vomiting she is to contact Dr. Cullen's office and a contact card has been provided.  4.  She is to wear her  abdominal binder when up and about.      Daniel Cullen MD  3/24/2019  12:04 PM      Please note that portions of this note were completed with a voice recognition program. Efforts were made to edit the dictations, but occasionally words are mistranscribed.

## 2020-03-03 ENCOUNTER — TRANSCRIBE ORDERS (OUTPATIENT)
Dept: DIABETES SERVICES | Facility: HOSPITAL | Age: 50
End: 2020-03-03

## 2020-03-03 DIAGNOSIS — E11.69 TYPE 2 DIABETES MELLITUS WITH OTHER SPECIFIED COMPLICATION, UNSPECIFIED WHETHER LONG TERM INSULIN USE (HCC): Primary | ICD-10-CM

## 2020-07-07 ENCOUNTER — TRANSCRIBE ORDERS (OUTPATIENT)
Dept: ADMINISTRATIVE | Facility: HOSPITAL | Age: 50
End: 2020-07-07

## 2020-07-07 DIAGNOSIS — R05.9 COUGH: ICD-10-CM

## 2020-07-07 DIAGNOSIS — R06.02 SHORTNESS OF BREATH: Primary | ICD-10-CM

## 2020-08-03 ENCOUNTER — APPOINTMENT (OUTPATIENT)
Dept: PREADMISSION TESTING | Facility: HOSPITAL | Age: 50
End: 2020-08-03

## 2020-08-03 PROCEDURE — C9803 HOPD COVID-19 SPEC COLLECT: HCPCS

## 2020-08-03 PROCEDURE — U0004 COV-19 TEST NON-CDC HGH THRU: HCPCS

## 2020-08-03 PROCEDURE — U0002 COVID-19 LAB TEST NON-CDC: HCPCS

## 2020-08-04 LAB
REF LAB TEST METHOD: NORMAL
SARS-COV-2 RNA RESP QL NAA+PROBE: NOT DETECTED

## 2020-08-06 ENCOUNTER — APPOINTMENT (OUTPATIENT)
Dept: PULMONOLOGY | Facility: HOSPITAL | Age: 50
End: 2020-08-06

## 2020-08-25 ENCOUNTER — APPOINTMENT (OUTPATIENT)
Dept: PREADMISSION TESTING | Facility: HOSPITAL | Age: 50
End: 2020-08-25

## 2020-08-25 LAB
REF LAB TEST METHOD: NORMAL
SARS-COV-2 RNA RESP QL NAA+PROBE: NOT DETECTED

## 2020-08-25 PROCEDURE — C9803 HOPD COVID-19 SPEC COLLECT: HCPCS

## 2020-08-25 PROCEDURE — U0002 COVID-19 LAB TEST NON-CDC: HCPCS

## 2020-08-25 PROCEDURE — U0004 COV-19 TEST NON-CDC HGH THRU: HCPCS

## 2020-08-27 ENCOUNTER — HOSPITAL ENCOUNTER (OUTPATIENT)
Dept: PULMONOLOGY | Facility: HOSPITAL | Age: 50
Discharge: HOME | End: 2020-08-27
Admitting: INTERNAL MEDICINE

## 2020-08-27 VITALS — HEART RATE: 76 BPM

## 2020-08-27 DIAGNOSIS — R06.02 SHORTNESS OF BREATH: ICD-10-CM

## 2020-08-27 DIAGNOSIS — R05.9 COUGH: ICD-10-CM

## 2020-08-27 PROCEDURE — 94060 EVALUATION OF WHEEZING: CPT

## 2020-08-27 PROCEDURE — 94060 EVALUATION OF WHEEZING: CPT | Performed by: INTERNAL MEDICINE

## 2020-08-27 PROCEDURE — 94729 DIFFUSING CAPACITY: CPT

## 2020-08-27 PROCEDURE — 94727 GAS DIL/WSHOT DETER LNG VOL: CPT

## 2020-08-27 PROCEDURE — 94640 AIRWAY INHALATION TREATMENT: CPT

## 2020-08-27 PROCEDURE — 94727 GAS DIL/WSHOT DETER LNG VOL: CPT | Performed by: INTERNAL MEDICINE

## 2020-08-27 PROCEDURE — 94729 DIFFUSING CAPACITY: CPT | Performed by: INTERNAL MEDICINE

## 2020-08-27 RX ADMIN — ALBUTEROL SULFATE 2.5 MG: 2.5 SOLUTION RESPIRATORY (INHALATION) at 11:45

## 2021-11-18 ENCOUNTER — OFFICE VISIT (OUTPATIENT)
Dept: NEUROSURGERY | Facility: CLINIC | Age: 51
End: 2021-11-18

## 2021-11-18 ENCOUNTER — TELEPHONE (OUTPATIENT)
Dept: NEUROSURGERY | Facility: CLINIC | Age: 51
End: 2021-11-18

## 2021-11-18 VITALS
SYSTOLIC BLOOD PRESSURE: 110 MMHG | RESPIRATION RATE: 18 BRPM | DIASTOLIC BLOOD PRESSURE: 87 MMHG | HEIGHT: 67 IN | WEIGHT: 165.2 LBS | HEART RATE: 120 BPM | OXYGEN SATURATION: 97 % | BODY MASS INDEX: 25.93 KG/M2

## 2021-11-18 DIAGNOSIS — R20.0 LEFT ARM NUMBNESS: ICD-10-CM

## 2021-11-18 DIAGNOSIS — M50.30 BULGE OF CERVICAL DISC WITHOUT MYELOPATHY: ICD-10-CM

## 2021-11-18 DIAGNOSIS — M54.12 CERVICAL RADICULOPATHY: ICD-10-CM

## 2021-11-18 DIAGNOSIS — M47.812 CERVICAL SPONDYLOSIS: Primary | ICD-10-CM

## 2021-11-18 PROCEDURE — 99204 OFFICE O/P NEW MOD 45 MIN: CPT | Performed by: NEUROLOGICAL SURGERY

## 2021-11-18 RX ORDER — ALBUTEROL SULFATE 90 UG/1
AEROSOL, METERED RESPIRATORY (INHALATION)
COMMUNITY
Start: 2021-11-15

## 2021-11-18 RX ORDER — DAPAGLIFLOZIN 5 MG/1
5 TABLET, FILM COATED ORAL 2 TIMES DAILY
COMMUNITY
Start: 2021-11-15

## 2021-11-18 RX ORDER — MONTELUKAST SODIUM 10 MG/1
10 TABLET ORAL NIGHTLY
COMMUNITY
Start: 2021-10-23

## 2021-11-18 RX ORDER — PREGABALIN 75 MG/1
75 CAPSULE ORAL 2 TIMES DAILY
Qty: 60 CAPSULE | Refills: 0 | Status: SHIPPED | OUTPATIENT
Start: 2021-11-18 | End: 2021-12-20

## 2021-11-18 RX ORDER — AMITRIPTYLINE HYDROCHLORIDE 25 MG/1
TABLET, FILM COATED ORAL
COMMUNITY
Start: 2021-11-15 | End: 2022-01-04 | Stop reason: ALTCHOICE

## 2021-11-18 RX ORDER — ASPIRIN 81 MG/1
81 TABLET ORAL DAILY
Status: ON HOLD | COMMUNITY
End: 2022-07-07

## 2021-11-18 RX ORDER — PROMETHAZINE HYDROCHLORIDE 25 MG/1
25 TABLET ORAL EVERY 6 HOURS PRN
COMMUNITY
Start: 2021-11-15

## 2021-11-18 RX ORDER — OMEPRAZOLE 40 MG/1
40 CAPSULE, DELAYED RELEASE ORAL DAILY PRN
COMMUNITY
Start: 2021-11-15

## 2021-11-18 NOTE — TELEPHONE ENCOUNTER
I called the patient back and told her it was ok to DC the Amitriptyline per Tommie LÓPEZ. She ok she wanted to make sure and thanked me for the call back.

## 2021-11-18 NOTE — PATIENT INSTRUCTIONS
Referral placed to see pain management specialist, orthopedic surgeon to discuss left shoulder pain and physical therapy.     Order placed to see MRI of the left shoulder.    Prescription for Diclofenac and Lyrica sent to pharmacy.     Follow up in 8 weeks with a new MRI scan.

## 2021-11-18 NOTE — TELEPHONE ENCOUNTER
Provider:  Javad  Caller:  patient  Time of call:  2:23   Phone #:  [phone]  Surgery:  na  Surgery Date:  na  Last visit:  11-18-21   Next visit: 1-    IVIS:         Reason for call:  Patient called and wanted to know if she should stop her Amitriptyline or would should she continue taking it, since he gave her other medicine to take. Please Advise. Thank you.

## 2021-12-02 ENCOUNTER — OFFICE VISIT (OUTPATIENT)
Dept: ORTHOPEDIC SURGERY | Facility: CLINIC | Age: 51
End: 2021-12-02

## 2021-12-02 VITALS
SYSTOLIC BLOOD PRESSURE: 150 MMHG | DIASTOLIC BLOOD PRESSURE: 92 MMHG | BODY MASS INDEX: 26.54 KG/M2 | WEIGHT: 165.12 LBS | HEIGHT: 66 IN | HEART RATE: 92 BPM

## 2021-12-02 DIAGNOSIS — M75.02 ADHESIVE CAPSULITIS OF LEFT SHOULDER: ICD-10-CM

## 2021-12-02 DIAGNOSIS — M75.42 IMPINGEMENT SYNDROME OF LEFT SHOULDER: ICD-10-CM

## 2021-12-02 DIAGNOSIS — M75.00 DIABETIC FROZEN SHOULDER ASSOCIATED WITH TYPE 2 DIABETES MELLITUS (HCC): Primary | ICD-10-CM

## 2021-12-02 DIAGNOSIS — E11.618 DIABETIC FROZEN SHOULDER ASSOCIATED WITH TYPE 2 DIABETES MELLITUS (HCC): Primary | ICD-10-CM

## 2021-12-02 PROCEDURE — 99204 OFFICE O/P NEW MOD 45 MIN: CPT | Performed by: ORTHOPAEDIC SURGERY

## 2021-12-02 PROCEDURE — 20610 DRAIN/INJ JOINT/BURSA W/O US: CPT | Performed by: ORTHOPAEDIC SURGERY

## 2021-12-02 RX ORDER — LIDOCAINE HYDROCHLORIDE 10 MG/ML
5 INJECTION, SOLUTION EPIDURAL; INFILTRATION; INTRACAUDAL; PERINEURAL
Status: COMPLETED | OUTPATIENT
Start: 2021-12-02 | End: 2021-12-02

## 2021-12-02 RX ORDER — METHYLPREDNISOLONE ACETATE 80 MG/ML
80 INJECTION, SUSPENSION INTRA-ARTICULAR; INTRALESIONAL; INTRAMUSCULAR; SOFT TISSUE
Status: COMPLETED | OUTPATIENT
Start: 2021-12-02 | End: 2021-12-02

## 2021-12-02 RX ADMIN — METHYLPREDNISOLONE ACETATE 80 MG: 80 INJECTION, SUSPENSION INTRA-ARTICULAR; INTRALESIONAL; INTRAMUSCULAR; SOFT TISSUE at 13:40

## 2021-12-02 RX ADMIN — LIDOCAINE HYDROCHLORIDE 5 ML: 10 INJECTION, SOLUTION EPIDURAL; INFILTRATION; INTRACAUDAL; PERINEURAL at 13:40

## 2021-12-02 NOTE — PROGRESS NOTES
[facility] Office Visit - Blair Farias MD    Office Visit       Patient Name: Angeles Viera    Chief Complaint: Left shoulder pain --clinical appearance of an left diabetic frozen shoulder    Referring Physician: Sammy Farnsworth MD-I appreciate the referral, consideration of injection left shoulder in the setting of neck issues and ongoing shoulder pain    History of Present Illness:   Angeles Viera is a 51 y.o. female who presents with left body part: shoulder Reason: pain.  Onset:Onset: atraumatic and gradual in nature. The issue has been ongoing for 7 month(s). Pain is a 8/10 on the pain scale. Pain is described as Pain Characterization: aching, throbbing, stabbing and shooting. Associated symptoms include Symptoms: stiffness. The pain is worse with working; nothing improves the pain. Previous treatments have included: nothing.  I have reviewed the patient's history of present illness as noted/entered above.    I have reviewed the patient's past medical history, surgical history, social history, family history, medications, and allergies as noted in the electronic medical record and as noted/entered.  I have reviewed the patient's review of systems as noted/enter and updated as noted in the patient's HPI.    Left shoulder pain and neck pain over the last 7 months    She reports pain shooting down to her fingers, throbbing, stabbing, shooting, aching difficulty with ADLs  Occupation pharmacy technician    *Clinical picture of a LEFT DIABETIC FROZEN SHOULDER    [place]    MRI was completed at [facility] 11/30/2021 fortunately no evidence of rotator cuff tearing was noted and have reviewed by Dr. Edson Bazan has essentially normal MRI left shoulder; I personally reviewed and interpreted the MRI and no rotator cuff tearing noted, but clinical picture notable for frozen shoulder      Subjective   Subjective       Review of Systems   Constitutional: Positive for fatigue. Negative for chills and fever.   HENT: Positive for congestion, trouble swallowing and voice change. Negative for dental problem.    Eyes: Negative.  Negative for blurred vision.   Respiratory: Positive for cough, choking, shortness of breath and wheezing.    Cardiovascular: Negative.  Negative for leg swelling.   Gastrointestinal: Positive for constipation. Negative for abdominal pain.   Endocrine: Negative.  Negative for polyuria.   Genitourinary: Negative.  Negative for difficulty urinating.   Musculoskeletal: Positive for arthralgias, back pain, neck pain and neck stiffness.   Skin: Negative.    Allergic/Immunologic: Negative.    Neurological: Positive for numbness and headache.   Hematological: Negative.  Negative for adenopathy.   Psychiatric/Behavioral: Negative for behavioral problems. The patient is nervous/anxious.         Past Medical History:   Past Medical History:   Diagnosis Date   • Abdominal hernia    • Bronchitis    • Diabetes mellitus (HCC)     pre diabetic, dieting controlled and using cpd oil   • Hashimoto's disease    • Hypertension        Past Surgical History:   Past Surgical History:   Procedure Laterality Date   • APPENDECTOMY     • COLONOSCOPY     • FOOT SURGERY Left    • HERNIA REPAIR      abdominal   • HYSTERECTOMY     • TONSILLECTOMY     • VENTRAL/INCISIONAL HERNIA REPAIR N/A 3/21/2019    Procedure: OPEN INCISIONAL HERNIA REPAIR WITH MESH, BILATERAL SEPERATION OF COMPONENTS;  Surgeon: Daniel Cullen MD;  Location: [facility];  Service: General       Family History:   Family History   Problem Relation Age of Onset   • Hyperlipidemia Mother    • Diabetes Mother    • Diabetes Father    • Hyperlipidemia Father    • Asthma Brother        Social History:   Social History     Socioeconomic History   • Marital status:    Tobacco Use   • Smoking status: Never Smoker   • Smokeless tobacco: Never Used   Substance and Sexual  "Activity   • Alcohol use: Yes     Comment: occastional   • Drug use: No   • Sexual activity: Defer       Medications:   Current Outpatient Medications:   •  albuterol sulfate  (90 Base) MCG/ACT inhaler, , Disp: , Rfl:   •  amitriptyline (ELAVIL) 25 MG tablet, , Disp: , Rfl:   •  aspirin (aspirin) 81 MG EC tablet, Take 81 mg by mouth Daily., Disp: , Rfl:   •  CBD (cannabidiol) oral oil, Take 1 drop by mouth Every Night., Disp: , Rfl:   •  diclofenac (VOLTAREN) 50 MG EC tablet, Take 1 tablet by mouth 2 (Two) Times a Day., Disp: 60 tablet, Rfl: 0  •  docusate sodium 100 MG capsule, Take 100 mg by mouth 2 (Two) Times a Day., Disp: 20 each, Rfl: 0  •  Farxiga 5 MG tablet tablet, , Disp: , Rfl:   •  Fluticasone Furoate-Vilanterol (Breo Ellipta) 100-25 MCG/INH inhaler, Inhale 1 puff Daily., Disp: , Rfl:   •  montelukast (SINGULAIR) 10 MG tablet, , Disp: , Rfl:   •  omeprazole (priLOSEC) 40 MG capsule, , Disp: , Rfl:   •  pregabalin (LYRICA) 75 MG capsule, Take 1 capsule by mouth 2 (Two) Times a Day., Disp: 60 capsule, Rfl: 0  •  promethazine (PHENERGAN) 25 MG tablet, , Disp: , Rfl:   •  vitamin D3 (vitamin d) 125 MCG (5000 UT) capsule capsule, Take 5,000 Units by mouth Daily., Disp: , Rfl:     Allergies: No Known Allergies    The following portions of the patient's history were reviewed and updated as appropriate: allergies, current medications, past family history, past medical history, past social history, past surgical history and problem list.        Objective    Objective      Vital Signs:   Vitals:    12/02/21 1305   BP: 150/92   Pulse: 92   Weight: 74.9 kg (165 lb 2 oz)   Height: 168.9 cm (66.5\")       Ortho Exam:  General: no acute distress, comfortable  Vitals reviewed in chart    Musculoskeletal Exam    SIDE: Left shoulder  Shoulder Exam:  Range of motion measurements (degrees)  Forward flexion/Abduction/External rotation at side/ER at 90/IR at 90/IR position  Active: Pain and guarding of the left " shoulder, protects, no evidence of septic joint, 70/70/30/60/50  Passive: Pain limited range of motion but better than active motion with multiple efforts    Diminished internal rotation and external rotation  Painful arc of motion  No evidence of septic joint  Pain with forward flexion and abduction greater than 50  Impingement testing Neer's test - positive/painful  Impingement testing Hawkin's test - positive/painful  Rotator cuff testing Alvin's test - mild pain but no obvious weakness, pain limited  Rotator cuff testing External rotation - no weakness  Rotator cuff testing Lag signs - absent  Rotator cuff testing Belly press - negative  Scapular dyskinesis - present, abnormal scapular motion      Results Review:   Imaging Results (Last 24 Hours)     ** No results found for the last 24 hours. **        MRI was completed at [facility] 11/30/2021 fortunately no evidence of rotator cuff tearing was noted and have reviewed by Dr. Edson Bazan has essentially normal MRI left shoulder; I personally reviewed and interpreted the MRI and no rotator cuff tearing noted.      Procedures     LEFT SHOULDER GLENOHUMERAL JOINT INJECTION:  Risks and benefits of a shoulder glenohumeral joint injection were discussed and the patient desired to proceed. Verbal consent was obtained. The patient understood the risk of infection, potential skin changes, bump in blood glucose especially with diabetes, nerve injury, possibility of increased pain in the short term, and possible incomplete pain relief. Using sterile technique, the glenohumeral joint was injected with 1mL of 80mg/mL Depo Medrol and 4cc of lidocaine with aspiration prior to injection. The patient tolerated the procedure without difficulty.  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    Physical therapy prescription provided and stretching program discussed    Steroid injection - a steroid injection can be beneficial and was offered.  Risks and benefits were discussed including a bump in blood glucose in the case of Diabetes -patient is a known type II diabetic    Follow-up - the patient can schedule an appointment for 2 months pending progress with the nonoperative treatment program    Patient counseling was performed with a discussion of the following:  What is a frozen shoulder?  Frozen Shoulder or Idiopathic Adhesive Capsulitis - the patient has a diagnosis of idiopathic adhesive capsulitis or “frozen shoulder.”  We discussed that this condition can have variable “freezing” and “thawing” phases that can be very painful.  Fortunately, this condition rarely requires operative intervention and responds to conservative measures gradually over time.  Unfortunately, I did  that the symptoms can last up to 6-12 months in some patients and frozen shoulder can return to the same shoulder or impact the other shoulder in the future.    When will I see the patient back?  I will see the patient back in 2 months to follow-up their progress pending progress or sooner if needed.  If the patient is improved then they can call to cancel the appointment.  If the patient has improved range of motion, but continued pain, then we will look for other potential causes  of shoulder pain at the follow-up appointment.  The antunez now is to improve the range of motion and gradually decrease the pain they are experiencing.    Follow Up: 2 months to reassess        Blair Farias MD, FAAOS  Orthopedic Surgeon  Fellowship Trained Shoulder and Elbow Surgeon  [facility]  Or[facility]oped[facility] and [facility] Medicine  [address] [place] [address], [address]  [place] [address]    12/02/21  13:43 EST

## 2021-12-02 NOTE — PROGRESS NOTES
Procedure   Large Joint Arthrocentesis: L glenohumeral  Date/Time: 12/2/2021 1:40 PM  Consent given by: patient  Site marked: site marked  Timeout: Immediately prior to procedure a time out was called to verify the correct patient, procedure, equipment, support staff and site/side marked as required   Supporting Documentation  Indications: pain   Procedure Details  Location: shoulder - L glenohumeral  Preparation: Patient was prepped and draped in the usual sterile fashion  Needle size: 23 G  Approach: posterior  Medications administered: 5 mL lidocaine PF 1% 1 %; 80 mg methylPREDNISolone acetate 80 MG/ML  Patient tolerance: patient tolerated the procedure well with no immediate complications

## 2021-12-18 DIAGNOSIS — M47.812 CERVICAL SPONDYLOSIS: ICD-10-CM

## 2021-12-18 DIAGNOSIS — M54.12 CERVICAL RADICULOPATHY: ICD-10-CM

## 2021-12-18 DIAGNOSIS — M50.30 BULGE OF CERVICAL DISC WITHOUT MYELOPATHY: ICD-10-CM

## 2021-12-18 DIAGNOSIS — R20.0 LEFT ARM NUMBNESS: ICD-10-CM

## 2021-12-20 ENCOUNTER — TREATMENT (OUTPATIENT)
Dept: PHYSICAL THERAPY | Facility: CLINIC | Age: 51
End: 2021-12-20

## 2021-12-20 DIAGNOSIS — M54.12 RADICULOPATHY, CERVICAL: Primary | ICD-10-CM

## 2021-12-20 DIAGNOSIS — M75.02 ADHESIVE CAPSULITIS OF LEFT SHOULDER: ICD-10-CM

## 2021-12-20 PROCEDURE — 97110 THERAPEUTIC EXERCISES: CPT | Performed by: PHYSICAL THERAPIST

## 2021-12-20 PROCEDURE — 97163 PT EVAL HIGH COMPLEX 45 MIN: CPT | Performed by: PHYSICAL THERAPIST

## 2021-12-20 RX ORDER — PREGABALIN 75 MG/1
75 CAPSULE ORAL 2 TIMES DAILY
Qty: 60 CAPSULE | Refills: 0 | Status: SHIPPED | OUTPATIENT
Start: 2021-12-20

## 2021-12-20 NOTE — TELEPHONE ENCOUNTER
Provider:  Zackary   Caller: Automated  Surgery:NA  Last visit: Office Visit with Sammy Farnsworth MD (11/18/2021)  Next visit: 1/10/22    IVIS:  11/18/2021 Pregabalin 75MG 1970 60 30 ZACKARY SALINAS [facility] Pharmacist  Group  [place] [id number]    Reason for call:       Requested Prescriptions     Pending Prescriptions Disp Refills   • pregabalin (LYRICA) 75 MG capsule [Pharmacy Med Name: PREGABALIN 75MG] 60 capsule 0     Sig: TAKE 1 CAPSULE BY MOUTH 2 (TWO) TIMES A DAY.   • diclofenac (VOLTAREN) 50 MG EC tablet [Pharmacy Med Name: DICLOFENAC 50MG DR] 60 tablet 0     Sig: TAKE 1 TABLET BY MOUTH 2 (TWO) TIMES A DAY.

## 2021-12-20 NOTE — PROGRESS NOTES
Physical Therapy Initial Evaluation and Plan of Care      Patient: Angeles Viera   : 1970  Diagnosis/ICD-10 Code:  The primary encounter diagnosis was Radiculopathy, cervical. A diagnosis of Adhesive capsulitis of left shoulder was also pertinent to this visit.   Referring practitioner: Sammy Farnsworth MD  Date of Initial Visit: Type: THERAPY  Noted: 2021  Today's Date: 2021  Patient seen for 1 sessions         Visit Diagnoses:    ICD-10-CM ICD-9-CM   1. Radiculopathy, cervical  M54.12 723.4   2. Adhesive capsulitis of left shoulder  M75.02 726.0       Subjective Questionnaire: QuickDASH: 34.09, NDI 44%    Subjective Evaluation    History of Present Illness  Onset date: 2021.  Mechanism of injury: Pt describes sudden, yet insidious onset of symptoms.  L shoulder pain began in about mid-April.  She awoke one morning and could not move her arm.  L UE tingling began in late April or early May.  She does not recall any unusual activity which may have contributed to symptom onset.    She recently moved her aging parents into her home and is helping care for them.  This takes a significant amount of effort on her part and limits her ability to focus on caring for herself.    Subjective comment: Neck and left arm pain  Patient Occupation: not working outside the home, but recently moved her parents into her home to help care for them, she enjoys decorating and crafts Pain  Current pain ratin  At best pain ratin  At worst pain ratin  Location: L shoulder region  Quality: throbbing, tight, cramping, radiating, discomfort and pulling  Relieving factors: rest and heat (hot shower, has not tried cold)  Aggravating factors: lifting (sudden L arm movement, laying on left side, movement or use of left arm)  Progression: worsening    Social Support  Lives in: multiple-level home  Lives with: parents and spouse    Hand dominance: right    Diagnostic Tests  MRI studies: abnormal (normal L  shoulder, but cervical spondylosis, facet arthrosis at multiple levels)    Treatments  Previous treatment: injection treatment and medication (L shoulder injection)  Patient Goals  Patient goals for therapy: decreased pain and increased motion           Treatment  Exercise 1  Exercise Name 1: Initial HEP education provided and practced in clinic today.         Functional Testing  Functional Tests Options: Quick DASH, Neck Disability Index (NDI)   Objective          Postural Observations  Seated posture: fair  Standing posture: fair  Correction of posture: has no consistent effect    Additional Postural Observation Details  Protracted bilateral scapulae, FHP.    Neurological Testing     Reflexes   Left   Biceps (C5/C6): normal (2+)  Brachioradialis (C6): normal (2+)  Triceps (C7): normal (2+)    Additional Neurological Details  Intermittent L UE paresthesia roughly in C5-6-7 distribution.    Active Range of Motion   Cervical/Thoracic Spine   Cervical    Subcranial retraction: restricted   Flexion: 45 (L UT pulling) degrees   Extension: 45 (L UT pain) degrees   Left lateral flexion: 30 (L tightness) degrees   Right lateral flexion: 30 (L neck pulling) degrees   Left rotation: 38 (L pain) degrees   Right rotation: 54 (pulls L) degrees   Left Shoulder   Flexion: 121 degrees   Abduction: 87 degrees   External rotation 90°: 43 degrees   External rotation BTH: Active external rotation behind the head: unable.   Internal rotation 90°: 27 degrees   Internal rotation BTB: Active internal rotation behind the back: unable.     Right Shoulder   External rotation 90°: 80 degrees  External rotation BTH: T3   Internal rotation 90°: 87 degrees   Internal rotation BTB: T10     Strength/Myotome Testing   Cervical Spine     Right   Normal strength    Left Shoulder     Planes of Motion   Flexion: 4+   Abduction: 4   External rotation at 0°: 4+   Internal rotation at 0°: 4     Left Elbow   Flexion: 4-  Extension: 4+    Left Wrist/Hand    Wrist extension: 4-  Wrist flexion: 4     (2nd hand position)     Trial 1: 20 lbs    Trial 2: 26 lbs    Trial 3: 27 lbs    Average: 24.33 lbs    Right Wrist/Hand      (2nd hand position)     Trial 1: 66 lbs    Trial 2: 71 lbs    Trial 3: 76 lbs    Average: 71 lbs    Additional Strength Details  R hand dominant    Tests   Cervical     Left   Positive active compression ([place]) and cervical distraction.           Assessment & Plan     Assessment  Impairments: abnormal or restricted ROM, activity intolerance, impaired physical strength, lacks appropriate home exercise program and pain with function  Functional Limitations: carrying objects, lifting, sleeping, uncomfortable because of pain, reaching behind back, reaching overhead and unable to perform repetitive tasks  Assessment details: Pt presents with 8 month history of pain and limited motion in left neck and L shoulder, as well as L UE paresthesia into first three fingers of L hand, radial forearm, upper arm intermittently.  L shoulder motion restrictions are consistent with adhesive capsulitis. Other L UE symptoms are consistent with cervical radiculopathy.  She has had NCV/EMG showing chronic C5-6 radiculopathy.  Prognosis: good    Goals  Plan Goals: 4 weeks  1) Pt. demonstrates independence and compliance with initial HEP.  2) Pt. reports reduction in pain intensity to no worse than 5/10 on NPRS.  3) AROM of neck and L shoulder shows improvement over baseline measures.  4) NDI score is improved by 8%, indicating improving functional abilities.  5) Quick DASH score is improved by 8 points, indicating improving functional abilities.    8 weeks  1) Pt. demonstrates independence in advanced HEP for ongoing improvement.  2) AROM of neck and L shoulder is sufficient for performance of daily activities.  3) Average L  strength is improved by 10#.  4) Pt reports increasing tolerance for daily household and self-care activities.         Plan  Therapy  options: will be seen for skilled therapy services  Planned modality interventions: cryotherapy and traction  Planned therapy interventions: manual therapy, neuromuscular re-education, postural training, spinal/joint mobilization, strengthening, stretching, therapeutic activities, joint mobilization, home exercise program, functional ROM exercises, flexibility and body mechanics training  Frequency: 1x week  Duration in weeks: 8  Treatment plan discussed with: patient  Plan details: PT weekly per POC, addressing pain and noted deficits in L UE function and L neck and shoulder mobility.        Timed:  Manual Therapy:         mins  02450;  Therapeutic Exercise:    15     mins  65570;     Neuromuscular Edgar:        mins  38006;    Therapeutic Activity:          mins  04905;     Gait Training:           mins  96209;     Ultrasound:          mins  06436;    Electrical Stimulation:         mins  71402 ( );  Iontophoresis  ___ mins   26533    Untimed:  Electrical Stimulation:         mins  48065 ( );  Mechanical Traction:         mins  62605;     Timed Treatment:   15   mins   Total Treatment:     45   mins    PT SIGNATURE: Olive Low PT   Electronically signed  DATE TREATMENT INITIATED: 12/20/2021    Initial Certification  Certification Period: 12/20/2021thru3/19/2022  I certify that the therapy services are furnished while this patient is under my care.  The services outlined above are required by this patient, and will be reviewed every 90 days.    Physician Signature:_____________________________________________     PHYSICIAN: Sammy Farnsworth MD      DATE:     Please sign and return via fax to [phone].. Thank you, [facility].

## 2021-12-29 ENCOUNTER — TREATMENT (OUTPATIENT)
Dept: PHYSICAL THERAPY | Facility: CLINIC | Age: 51
End: 2021-12-29

## 2021-12-29 DIAGNOSIS — M75.02 ADHESIVE CAPSULITIS OF LEFT SHOULDER: ICD-10-CM

## 2021-12-29 DIAGNOSIS — M54.12 RADICULOPATHY, CERVICAL: Primary | ICD-10-CM

## 2021-12-29 PROCEDURE — 97140 MANUAL THERAPY 1/> REGIONS: CPT | Performed by: PHYSICAL THERAPIST

## 2021-12-29 PROCEDURE — 97110 THERAPEUTIC EXERCISES: CPT | Performed by: PHYSICAL THERAPIST

## 2021-12-29 NOTE — PROGRESS NOTES
Physical Therapy Daily Progress Note  VISIT: 2          Subjective    Angeles Viera reports: she stretches her arm in the shower.  She has had limited time to do HEP as she is caring for both of her parents who have dementia.      Pre-treatment pain:  5  Post-treatment pain:  4      Objective    Treatment    Exercise 1  Exercise Name 1: seated chin tuck with patient OP  Exercise 2  Exercise Name 2: supine chin tuck on towel roll  Exercise 3  Exercise Name 3: supine L shoulder flexion with wand  Exercise 4  Exercise Name 4: supine L shoulder ER with wand  Exercise 5  Exercise Name 5: standing L shoulder extension with wand  Exercise 6  Exercise Name 6: scapular squeeze    Manual Rx 1  Manual Rx 1 Location: Neck  Manual Rx 1 Type: suboccipital release, manual traction, passive flexion  Manual Rx 2  Manual Rx 2 Location: L shoulder  Manual Rx 2 Type: passive scapular retraction and depression, STM at lateral scapular border and pectoral area            Assessment & Plan     Assessment    Assessment details: Pt reports L UE paresthesia with most movements.  Symptoms resolve with return to resting position.    Plan  Plan details: Continue PT.  Progress L shoulder motion as well as CT motion as tolerated.               Timed:  Manual Therapy:    15     mins  01928;  Therapeutic Exercise:    25     mins  73772;     Neuromuscular Edgar:        mins  06156;    Therapeutic Activity:          mins  85240;     Gait Training:           mins  48470;     Ultrasound:          mins  83362;    Electrical Stimulation:         mins  82808 ( );    Untimed:  Electrical Stimulation:         mins  18555 ( );  Mechanical Traction:         mins  33135;     Timed Treatment:   40   mins   Total Treatment:     40   mins      Olive Low, PT  Physical Therapist

## 2022-01-02 PROBLEM — M50.30 DDD (DEGENERATIVE DISC DISEASE), CERVICAL: Status: ACTIVE | Noted: 2022-01-02

## 2022-01-02 PROBLEM — M48.02 CERVICAL SPINAL STENOSIS: Status: ACTIVE | Noted: 2022-01-02

## 2022-01-02 NOTE — PROGRESS NOTES
"Chief Complaint: \" Pain in my neck, left shoulder, left arm.\"        History of Present Illness:   Patient: Ms. Angeles Viera, 51 y.o. female   Referring Physician: Dr. Sammy Farnsworth on 11/18/2021   Reason for Referral: Consultation for chronic intractable neck, left shoulder, left upper extremity pain.   Pain History:  Patient reports a longstanding history of chronic axial arthritic pain and a more recent history of exacerbation of left shoulder pain, which began without incident. Patient presented with a significant decrease in the range of motion of the left shoulder consistent with frozen shoulder.  MRI of the left shoulder was essentially unrevealing. MRI of the cervical spine revealed multilevel cervical spondylosis with multilevel disc osteophyte complexes and facet hypertrophy particularly at C4-C5 and C5-C6 with a left-sided intraforaminal disc herniation.  Electrodiagnostic studies of the upper extremities, as per Dr. Farnsworth's note, were consistent with chronic left C5-6 radiculopathy.  Patient has failed to obtain pain relief with conservative measures including oral analgesics, physical therapy, to name a few. Angeles Viera underwent neurosurgical consultation with Dr. Sammy Farnsworth on 11/18/2021, and was found not to be a surgical candidate at that point.  Dr. Farnsworth ordered an MRI of the left shoulder and made a referral for orthopedic surgical consultation. Patient underwent orthopedic surgical consultation with Dr. Blair Farias on 12/2/2021 and was diagnosed with adhesive capsulitis of the left shoulder.  Dr. Farias recommended conservative measures along with the performance of left glenohumeral joint injection with steroids on 12/2/2021. Patient reports significant improvement of her left shoulder issues, particularly the ROM. Pain has progressed in intensity over the past several months.    Pain Description: Constant pain with intermittent exacerbation, described as sharp, shooting, aching, " and throbbing sensation.   Radiation of Pain: The pain radiates from the posterior cervical region into the right shoulder, and the left side into the left shoulder and down in the anterior and lateral aspect of pain left upper extremity and into her thumb, left index and left middle finger  Pain intensity today: 6/10  Average pain intensity last week: 6/10  Pain intensity ranges from: 4/10 to 8/10  Aggravating factors: Pain increases with extension, flexion, rotation of the cervical spine   Alleviating factors: Pain decreases with flexion of the cervical spine, lying down with a pillow under neck  Associated Symptoms:   Patient reports pain, numbness, and weakness in the left upper extremity.   Patient denies any new bladder or bowel problems.   Patient denies difficulties with her balance or recent falls.   Patient reports bilateral cervicogenic and occipital headaches 3 x per week lasting several hours.  Pain interferes with her sleep causing sleep fragmentation    Review of previous therapies and additional medical records:  Angeles Viera has already failed the following measures, including:   Conservative Measures: Oral analgesics, topical analgesics, home exercise program, physical therapy   Interventional Measures: Left glenohumeral joint injection with steroids on 12/2/2021 by Dr Farias.   Surgical Measures: No history of previous cervical spine or shoulder surgery   Angeles Viera underwent neurosurgical consultation with Dr. Sammy Farnsworth on 11/18/2021, and was found not to be a surgical candidate.  Patient underwent orthopedic surgical consultation with Dr. Blair Farias on 12/2/2021 and was diagnosed with adhesive capsulitis of the left shoulder.  Dr. Farias recommended conservative measures along with the performance of left glenohumeral joint injection with steroids on 12/2/2021.  Angeles Viera presents with significant comorbidities including diabetes mellitus, Hashimoto's disease, hypertension  frozen shoulder  in terms of current analgesics, Angeles MUNIR Maximiliano takes: Lyrica, Voltaren. Patient also takes promethazine PRN  I have reviewed Daljit Report #[id number] (pregabalin) consistent with medication reconciliation.  SOAPP: Low Risk     Global Pain Scale 01-04  2022          Pain 15          Feelings 10          Clinical outcomes 7          Activities 7          GPS Total: 39            Review of Diagnostic Studies:  I have reviewed the images with the patient as well as the reports, as follows;  MRI of the cervical spine without contrast 9/20/2021.  Diffuse spondylosis with facet arthrosis from C2-C3 through C6-C7.  Vertebral body heights and alignment are preserved.  Axial imaging:  C1-C2: Negative  C2-C3: Facet hypertrophy.  Mild left neuroforaminal stenosis  C3-C4: Disc osteophyte complex, uncovertebral and facet joint hypertrophy.  Mild canal stenosis and moderate bilateral neuroforaminal stenosis  C4-C5: Uncovertebral and facet hypertrophy.  Mild left and moderate right neuroforaminal stenosis  C5-C6: Disc osteophyte complex, uncovertebral and facet joint hypertrophy.  Mild leftward canal stenosis.  Moderate left neuroforaminal stenosis  C6-C7: Disc osteophyte complex, uncovertebral and facet hypertrophy.  Mild canal stenosis.  Mild to moderate bilateral neuroforaminal stenosis  C7-T1: No significant canal or foraminal stenosis  MRI of the left shoulder with and without contrast 11/30/2021.  Mild AC joint hypertrophy.  No significant subacromial space stenosis.  Rotator cuff is intact.  The biceps and labrum are intact.  There is no bony abnormality of the humeral head or glenoid.      Review of Systems   Constitutional: Positive for fatigue.   HENT: Positive for congestion, trouble swallowing and voice change.    Respiratory: Positive for cough, shortness of breath and wheezing.    Gastrointestinal: Positive for diarrhea.   Musculoskeletal: Positive for neck pain and neck stiffness.   Neurological:  Positive for numbness and headaches.   Psychiatric/Behavioral: The patient is nervous/anxious.    All other systems reviewed and are negative.        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)   • Adhesive capsulitis of left shoulder   • Impingement syndrome of left shoulder   • Cervical spinal stenosis   • DDD (degenerative disc disease), cervical   • Cervical spondylosis without myelopathy   • Radiculopathy, cervical region   • Overweight with body mass index (BMI) of 27 to 27.9 in adult   • Muscle spasms of neck       Past Medical History:   Diagnosis Date   • Abdominal hernia    • Bronchitis    • Diabetes mellitus (HCC)     pre diabetic, dieting controlled and using cpd oil   • Hashimoto's disease    • Hypertension          Past Surgical History:   Procedure Laterality Date   • APPENDECTOMY     • COLONOSCOPY     • FOOT SURGERY Left    • HERNIA REPAIR      abdominal   • HYSTERECTOMY     • TONSILLECTOMY     • VENTRAL/INCISIONAL HERNIA REPAIR N/A 3/21/2019    Procedure: OPEN INCISIONAL HERNIA REPAIR WITH MESH, BILATERAL SEPERATION OF COMPONENTS;  Surgeon: Daniel Cullen MD;  Location: [facility];  Service: General         Family History   Problem Relation Age of Onset   • Hyperlipidemia Mother    • Diabetes Mother    • Diabetes Father    • Hyperlipidemia Father    • Asthma Brother          Social History     Socioeconomic History   • Marital status:    Tobacco Use   • Smoking status: Never Smoker   • Smokeless tobacco: Never Used   Vaping Use   • Vaping Use: Never used   Substance and Sexual Activity   • Alcohol use: Yes     Comment: occastional   • Drug use: No   • Sexual activity: Defer           Current Outpatient Medications:   •  albuterol sulfate  (90 Base) MCG/ACT inhaler, , Disp: , Rfl:   •  amitriptyline (ELAVIL) 25 MG tablet, ,  "Disp: , Rfl:   •  aspirin (aspirin) 81 MG EC tablet, Take 81 mg by mouth Daily., Disp: , Rfl:   •  CBD (cannabidiol) oral oil, Take 1 drop by mouth Every Night., Disp: , Rfl:   •  diclofenac (VOLTAREN) 50 MG EC tablet, TAKE 1 TABLET BY MOUTH 2 (TWO) TIMES A DAY., Disp: 60 tablet, Rfl: 0  •  docusate sodium 100 MG capsule, Take 100 mg by mouth 2 (Two) Times a Day., Disp: 20 each, Rfl: 0  •  Farxiga 5 MG tablet tablet, , Disp: , Rfl:   •  Fluticasone Furoate-Vilanterol (Breo Ellipta) 100-25 MCG/INH inhaler, Inhale 1 puff Daily., Disp: , Rfl:   •  montelukast (SINGULAIR) 10 MG tablet, , Disp: , Rfl:   •  omeprazole (priLOSEC) 40 MG capsule, , Disp: , Rfl:   •  pregabalin (LYRICA) 75 MG capsule, TAKE 1 CAPSULE BY MOUTH 2 (TWO) TIMES A DAY., Disp: 60 capsule, Rfl: 0  •  promethazine (PHENERGAN) 25 MG tablet, , Disp: , Rfl:   •  vitamin D3 (vitamin d) 125 MCG (5000 UT) capsule capsule, Take 5,000 Units by mouth Daily., Disp: , Rfl:       No Known Allergies      /92   Pulse 89   Temp 96 °F (35.6 °C) (Infrared)   Resp 14   Ht 168.9 cm (66.5\")   Wt 79.6 kg (175 lb 6.4 oz)   SpO2 98%   BMI 27.89 kg/m²       Physical Exam:  Constitutional: Patient appears well-developed, well-nourished, well-hydrated  HEENT: Head: Normocephalic and atraumatic  Eyes: Conjunctivae and lids are normal  Pupils: Equal, round, reactive to light  Neck: Trachea normal. Neck supple. No JVD present.   Lymphatic: No cervical adenopathy  Musculoskeletal   Gait and station: Gait evaluation demonstrated a normal gait. Able to walk on heels, toes, tandem walking   Cervical spine: Passive and active range of motion are limited secondary to pain. Extension, lateral flexion, rotation of the cervical spine increased and reproduced pain. Cervical facet joint loading maneuvers are positive.  Muscles: Presence of active trigger points at the levator scapulae   Shoulders: The range of motion of the right glenohumeral joint is almost full and without " pain. Rotator cuff strength is 5/5. On the left, decreased active and passive ROM. Rotator cuff strength 5/5  Neurological:   Patient is alert and oriented to person, place, and time.   Speech: Normal.   Cortical function: Normal mental status.   Reflex Scores:  Right brachioradialis: 2+  Left brachioradialis: 2+  Right biceps: 2+  Left biceps: 2+  Right triceps: 2+  Left triceps: 2+  Right patellar: 2+  Left patellar: 2+  Right Achilles: 2+  Left Achilles: 2+  Motor strength: 5/5  Motor Tone: Normal  Involuntary movements: None.   Superficial/Primitive Reflexes: Primitive reflexes were absent.   Right Gonzalez: Absent  Left Gonzalez: Absent  Right ankle clonus: Absent  Left ankle clonus: Absent   Babinsky: Absent  Spurling sign: Positive. Neck tornado test: Positive. Lhermitte sign: Negative. Long tract signs: Negative.   Sensory exam: Intact to light touch, intact pain and temperature sensation, intact vibration sensation and normal proprioception.   Coordination: Normal finger to nose and heel to shin. Normal balance and negative Romberg's sign   Skin and subcutaneous tissue: Skin is warm and intact. No rash noted. No cyanosis.   Psychiatric: Judgment and insight: Normal. Recent and remote memory: Intact. Mood and affect: Normal.     ASSESSMENT:   1. Radiculopathy, cervical region    2. Cervical spinal stenosis    3. DDD (degenerative disc disease), cervical    4. Cervical spondylosis without myelopathy    5. Muscle spasms of neck    6. Adhesive capsulitis of left shoulder    7. 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 Diabetes mellitus type 2 in obese (HCC)    9. Overweight with body mass index (BMI) of 27 to 27.9 in adult    10. Anxiety          PLAN/MEDICAL DECISION MAKING: Patient reports a longstanding history of chronic axial arthritic neck pain and a more recent history of exacerbation of left shoulder pain, which began without incident. Patient presented with a  significant decrease in the range of motion of the left shoulder consistent with frozen shoulder. MRI of the cervical spine revealed multilevel cervical spondylosis with multilevel disc osteophyte complexes and facet hypertrophy particularly at C4-C5 and C5-C6 with a left-sided intraforaminal disc herniation.  Electrodiagnostic studies of the upper extremities, as per Dr. Farnsworth's note, were consistent with chronic left C5-6 radiculopathy.  Patient has failed to obtain pain relief with conservative measures including oral analgesics, physical therapy, to name a few. Angeles Viera underwent neurosurgical consultation with Dr. Sammy Farnsworth on 11/18/2021, and was found not to be a surgical candidate at that point.  Dr. Farnsworth ordered an MRI of the left shoulder and made a referral for orthopedic surgical consultation. MRI of the left shoulder was essentially unrevealing. Patient underwent orthopedic surgical consultation with Dr. Blair Farias on 12/2/2021 and was diagnosed with adhesive capsulitis of the left shoulder. Dr. Farias recommended conservative measures along with the performance of left glenohumeral joint injection with steroids on 12/2/2021. Patient reports some improvement of her left shoulder issues, particularly regaining some of the active ROM. Her neck and left upper extremity pain have progressed in intensity over the past several months. Patient has failed to obtain pain relief with conservative measures, as referenced above. I have reviewed all available patient's medical records as well as previous therapies as referenced above. I had a lengthy conversation with Ms. Angeles Viera regarding her chronic pain condition and potential therapeutic options including risks, benefits, alternative therapies, to name a few. Therefore, I have proposed the following plan:  1. Diagnostic studies: Patient may need cervical myelogram followed by CT postmyelogram if she continues to struggle with pain  2.  Pharmacological measures: Reviewed and discussed;   A. Patient takes Lyrica, Voltaren. Patient also takes promethazine PRN  B. Trial with nortriptyline 10 mg 1-2 tablets at bedtime, #60, 1 refill  C. Trial with tizanidine 2 mg 1/2 to 1 tablet 2 times a day as needed muscle spasm, #30, 1 refill  D. Trial with Rheumate one tablet twice daily  E. Start pyridoxine 100 mg one tablet by mouth daily, #30, take for 30 days, no refills  F. Start alpha lipoid acid 9693-6773 mg per day divided into 3 doses  G. Trial with prilocaine 2%, lidocaine 10%, imipramine 3%, capsaicin 0.001% and mannitol 20% cream, apply 1 to 2 grams of cream to the affected areas every 4 to 6 hours as needed  3. Interventional pain management measures: Patient will be scheduled for diagnostic and therapeutic left C5-C6 transforaminal epidural steroid injection. We may repeat epidural depending on patient's outcome.  Patient will follow-up with Dr. Farnsworth thereafter to discuss possible cervical myelogram for surgical delineation.  In addition, patient presents with significant mechanical facetogenic pain of the cervical spine which could be addressed in the future.  She sees Dr. Blair Farias for her frozen shoulder.  4. Long-term rehabilitation efforts:  A. The patient does not have a history of falls. I did complete a risk assessment for falls  B. Patient will start a comprehensive physical therapy program at [facility] for upper body strengthening/posture correction, myofascial release, cupping and dry needling, posture/position body mechanics, range of motion/flexibility, joint mobilization   C. Start an exercise program such as yoga  D. Contrast therapy: Apply ice-packs for 15-20 minutes, followed by heating pads for 15-20 minutes to affected area   E. Referral to [facility]. Patient's Body mass index is 17.89 kg/m².   5. The patient and her family have been instructed to contact my office with any  questions or difficulties. The patient understands the plan and agrees to proceed accordingly.      Patient Care Team:  Yolie Valiente PA as PCP - General (Family Medicine)     No orders of the defined types were placed in this encounter.        Future Appointments   Date Time Provider Department Center   1/5/2022  1:00 PM Olive Low, PT [facility]   1/10/2022 11:30 AM Sammy Farnsworth MD [facility]   1/12/2022  2:30 PM Olive Low, PT [facility]   1/19/2022  1:00 PM LowOlive falk, PT [facility]   2/2/2022  1:00 PM LowOlive falk, PT [facility]   2/3/2022 10:00 AM Blair Farias MD [facility]   2/9/2022  1:00 PM LowOlive falk, PT [facility]         Gm Robles MD     Please note that portions of this note were completed with a voice recognition program.

## 2022-01-04 ENCOUNTER — OFFICE VISIT (OUTPATIENT)
Dept: PAIN MEDICINE | Facility: CLINIC | Age: 52
End: 2022-01-04

## 2022-01-04 ENCOUNTER — TELEPHONE (OUTPATIENT)
Dept: NEUROSURGERY | Facility: CLINIC | Age: 52
End: 2022-01-04

## 2022-01-04 VITALS
OXYGEN SATURATION: 98 % | RESPIRATION RATE: 14 BRPM | HEART RATE: 89 BPM | TEMPERATURE: 96 F | DIASTOLIC BLOOD PRESSURE: 92 MMHG | HEIGHT: 67 IN | SYSTOLIC BLOOD PRESSURE: 141 MMHG | WEIGHT: 175.4 LBS | BODY MASS INDEX: 27.53 KG/M2

## 2022-01-04 DIAGNOSIS — M54.12 RADICULOPATHY, CERVICAL REGION: ICD-10-CM

## 2022-01-04 DIAGNOSIS — E66.9 DIABETES MELLITUS TYPE 2 IN OBESE: ICD-10-CM

## 2022-01-04 DIAGNOSIS — E11.69 DIABETES MELLITUS TYPE 2 IN OBESE: ICD-10-CM

## 2022-01-04 DIAGNOSIS — F41.9 ANXIETY: ICD-10-CM

## 2022-01-04 DIAGNOSIS — M47.812 CERVICAL SPONDYLOSIS WITHOUT MYELOPATHY: ICD-10-CM

## 2022-01-04 DIAGNOSIS — M75.02 ADHESIVE CAPSULITIS OF LEFT SHOULDER: ICD-10-CM

## 2022-01-04 DIAGNOSIS — M48.02 CERVICAL SPINAL STENOSIS: ICD-10-CM

## 2022-01-04 DIAGNOSIS — M75.00 DIABETIC FROZEN SHOULDER ASSOCIATED WITH TYPE 2 DIABETES MELLITUS: ICD-10-CM

## 2022-01-04 DIAGNOSIS — M62.838 MUSCLE SPASMS OF NECK: ICD-10-CM

## 2022-01-04 DIAGNOSIS — E66.3 OVERWEIGHT WITH BODY MASS INDEX (BMI) OF 27 TO 27.9 IN ADULT: ICD-10-CM

## 2022-01-04 DIAGNOSIS — M54.12 RADICULOPATHY, CERVICAL REGION: Primary | ICD-10-CM

## 2022-01-04 DIAGNOSIS — M50.30 DDD (DEGENERATIVE DISC DISEASE), CERVICAL: ICD-10-CM

## 2022-01-04 DIAGNOSIS — E11.618 DIABETIC FROZEN SHOULDER ASSOCIATED WITH TYPE 2 DIABETES MELLITUS: ICD-10-CM

## 2022-01-04 PROCEDURE — 99204 OFFICE O/P NEW MOD 45 MIN: CPT | Performed by: ANESTHESIOLOGY

## 2022-01-04 RX ORDER — ST. JOHN'S WORT 300 MG
400 CAPSULE ORAL 3 TIMES DAILY
Qty: 180 CAPSULE | Refills: 5 | Status: SHIPPED | OUTPATIENT
Start: 2022-01-04 | End: 2022-01-05 | Stop reason: SDUPTHER

## 2022-01-04 RX ORDER — NORTRIPTYLINE HYDROCHLORIDE 10 MG/1
10-20 CAPSULE ORAL
Qty: 60 CAPSULE | Refills: 1 | Status: SHIPPED | OUTPATIENT
Start: 2022-01-04 | End: 2022-01-05 | Stop reason: SDUPTHER

## 2022-01-04 RX ORDER — MULTIVITAMIN WITH IRON
100 TABLET ORAL DAILY
Qty: 30 TABLET | Refills: 0 | Status: SHIPPED | OUTPATIENT
Start: 2022-01-04 | End: 2022-01-05 | Stop reason: SDUPTHER

## 2022-01-04 RX ORDER — TIZANIDINE 2 MG/1
1-2 TABLET ORAL 3 TIMES DAILY PRN
Qty: 30 TABLET | Refills: 1 | Status: SHIPPED | OUTPATIENT
Start: 2022-01-04 | End: 2022-01-05 | Stop reason: SDUPTHER

## 2022-01-04 RX ORDER — ME-TETRAHYDROFOLATE/B12/HRB236 1-1-500 MG
1 CAPSULE ORAL DAILY
Qty: 90 CAPSULE | Refills: 1 | Status: SHIPPED | OUTPATIENT
Start: 2022-01-04

## 2022-01-04 NOTE — TELEPHONE ENCOUNTER
Please cancel pt's upcoming appt with Dr. Farnsworth. She is seeing Dr. Robles today. She will need to f/u about 1 week after her injection.     Thank you!

## 2022-01-05 DIAGNOSIS — M54.12 RADICULOPATHY, CERVICAL REGION: ICD-10-CM

## 2022-01-05 RX ORDER — MULTIVITAMIN WITH IRON
100 TABLET ORAL DAILY
Qty: 30 TABLET | Refills: 0 | Status: SHIPPED | OUTPATIENT
Start: 2022-01-05

## 2022-01-05 RX ORDER — NORTRIPTYLINE HYDROCHLORIDE 10 MG/1
10-20 CAPSULE ORAL
Qty: 60 CAPSULE | Refills: 1 | Status: SHIPPED | OUTPATIENT
Start: 2022-01-05 | End: 2022-04-26 | Stop reason: SDUPTHER

## 2022-01-05 RX ORDER — TIZANIDINE 2 MG/1
1-2 TABLET ORAL 3 TIMES DAILY PRN
Qty: 30 TABLET | Refills: 1 | Status: SHIPPED | OUTPATIENT
Start: 2022-01-05 | End: 2022-03-22

## 2022-01-05 RX ORDER — ST. JOHN'S WORT 300 MG
400 CAPSULE ORAL 3 TIMES DAILY
Qty: 180 CAPSULE | Refills: 5 | Status: SHIPPED | OUTPATIENT
Start: 2022-01-05

## 2022-01-20 ENCOUNTER — TELEPHONE (OUTPATIENT)
Dept: PAIN MEDICINE | Facility: CLINIC | Age: 52
End: 2022-01-20

## 2022-01-20 NOTE — TELEPHONE ENCOUNTER
Caller: RODRIGO  Relationship to Patient: SELF  Phone Number: [phone]  Reason for Call: PT CALLED STATING THAT SHE NEEDS TO R/S PROCEDURE FOR NEXT WEEK DUE TO BEING COVID POSITIVE. PLEASE ADVISE PT AT ABOVE PHONE NUMBER

## 2022-02-03 ENCOUNTER — OFFICE VISIT (OUTPATIENT)
Dept: ORTHOPEDIC SURGERY | Facility: CLINIC | Age: 52
End: 2022-02-03

## 2022-02-03 VITALS
HEIGHT: 66 IN | SYSTOLIC BLOOD PRESSURE: 118 MMHG | WEIGHT: 175.49 LBS | DIASTOLIC BLOOD PRESSURE: 78 MMHG | BODY MASS INDEX: 28.2 KG/M2

## 2022-02-03 DIAGNOSIS — M75.42 IMPINGEMENT SYNDROME OF LEFT SHOULDER: ICD-10-CM

## 2022-02-03 DIAGNOSIS — E11.618 DIABETIC FROZEN SHOULDER ASSOCIATED WITH TYPE 2 DIABETES MELLITUS: Primary | ICD-10-CM

## 2022-02-03 DIAGNOSIS — M75.02 ADHESIVE CAPSULITIS OF LEFT SHOULDER: ICD-10-CM

## 2022-02-03 DIAGNOSIS — M75.00 DIABETIC FROZEN SHOULDER ASSOCIATED WITH TYPE 2 DIABETES MELLITUS: Primary | ICD-10-CM

## 2022-02-03 PROCEDURE — 99212 OFFICE O/P EST SF 10 MIN: CPT | Performed by: ORTHOPAEDIC SURGERY

## 2022-02-03 NOTE — PROGRESS NOTES
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 provider found    History of Present Illness:   It has been 2  month(s) since Angeles Viera's last visit. 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 The issue has been ongoing for 1 year(s). Angeles Viera rates HIS/HER: herpain at 3/10 on the pain scale. Previous/current treatments: physical therapy. Current symptoms:Symptoms: pain, popping, grinding and stiffness. The pain is worse with working, lying on affected side and fast movements;  Overall, he/she: sheis doing better.  I have reviewed the patient's history of present illness as noted/entered above.    I have reviewed the patient's past medical history, surgical history, social history, family history, medications, and allergies as noted in the electronic medical record and as noted/entered.  I have reviewed the patient's review of systems as noted/enter and updated as noted in the patient's HPI.    Left frozen shoulder doing better following injection and physical therapy a lot of home exercises to, supportive  present.  Robby      Subjective   Subjective      Review of Systems   Constitutional: Negative.  Negative for chills, fatigue and fever.   HENT: Negative.  Negative for congestion and dental problem.    Eyes: Negative.  Negative for blurred vision.   Respiratory: Negative.  Negative for shortness of breath.    Cardiovascular: Negative.  Negative for leg swelling.   Gastrointestinal: Negative.  Negative for abdominal pain.   Endocrine: Negative.  Negative for  polyuria.   Genitourinary: Negative.  Negative for difficulty urinating.   Musculoskeletal: Positive for arthralgias.   Skin: Negative.    Allergic/Immunologic: Negative.    Neurological: Negative.    Hematological: Negative.  Negative for adenopathy.   Psychiatric/Behavioral: Negative.  Negative for behavioral problems.        Past Medical History:   Past Medical History:   Diagnosis Date   • Abdominal hernia    • Bronchitis    • COVID 01/19/2022   • Diabetes mellitus (HCC)     pre diabetic, dieting controlled and using cpd oil   • Hashimoto's disease    • Hypertension        Past Surgical History:   Past Surgical History:   Procedure Laterality Date   • APPENDECTOMY     • COLONOSCOPY     • FOOT SURGERY Left    • HERNIA REPAIR      abdominal   • HYSTERECTOMY     • TONSILLECTOMY     • VENTRAL/INCISIONAL HERNIA REPAIR N/A 3/21/2019    Procedure: OPEN INCISIONAL HERNIA REPAIR WITH MESH, BILATERAL SEPERATION OF COMPONENTS;  Surgeon: Daniel Cullen MD;  Location: [facility];  Service: General       Family History:   Family History   Problem Relation Age of Onset   • Hyperlipidemia Mother    • Diabetes Mother    • Diabetes Father    • Hyperlipidemia Father    • Asthma Brother        Social History:   Social History     Socioeconomic History   • Marital status:    Tobacco Use   • Smoking status: Never Smoker   • Smokeless tobacco: Never Used   Vaping Use   • Vaping Use: Never used   Substance and Sexual Activity   • Alcohol use: Yes     Comment: occastional   • Drug use: No   • Sexual activity: Defer       Medications:   Current Outpatient Medications:   •  albuterol sulfate  (90 Base) MCG/ACT inhaler, , Disp: , Rfl:   •  Alpha Lipoic Acid 200 MG capsule, Take 2 capsules (400mg) by mouth 3 (Three) Times a Day., Disp: 180 capsule, Rfl: 5  •  aspirin (aspirin) 81 MG EC tablet, Take 81 mg by mouth Daily., Disp: , Rfl:   •  CBD (cannabidiol) oral oil, Take 1 drop by mouth Every Night., Disp: , Rfl:   •   "diclofenac (VOLTAREN) 50 MG EC tablet, TAKE 1 TABLET BY MOUTH 2 (TWO) TIMES A DAY., Disp: 60 tablet, Rfl: 0  •  Dietary Management Product (Rheumate) capsule, Take 1 capsule by mouth Daily., Disp: 90 capsule, Rfl: 1  •  docusate sodium 100 MG capsule, Take 100 mg by mouth 2 (Two) Times a Day., Disp: 20 each, Rfl: 0  •  Farxiga 5 MG tablet tablet, , Disp: , Rfl:   •  Fluticasone Furoate-Vilanterol (Breo Ellipta) 100-25 MCG/INH inhaler, Inhale 1 puff Daily., Disp: , Rfl:   •  Gel Base gel, 2 g 4 (Four) Times a Day. prilocaine 2%, lidocaine 10%, imipramine 3%, capsaicin 0.001% and mannitol 20%, Disp: 240 g, Rfl: 5  •  montelukast (SINGULAIR) 10 MG tablet, , Disp: , Rfl:   •  nortriptyline (PAMELOR) 10 MG capsule, Take 1-2 capsules by mouth every night at bedtime., Disp: 60 capsule, Rfl: 1  •  omeprazole (priLOSEC) 40 MG capsule, , Disp: , Rfl:   •  pregabalin (LYRICA) 75 MG capsule, TAKE 1 CAPSULE BY MOUTH 2 (TWO) TIMES A DAY., Disp: 60 capsule, Rfl: 0  •  promethazine (PHENERGAN) 25 MG tablet, , Disp: , Rfl:   •  tiZANidine (ZANAFLEX) 2 MG tablet, Take 0.5-1 tablets by mouth 3 (Three) Times a Day As Needed for muscle spasms, Disp: 30 tablet, Rfl: 1  •  vitamin B-6 (PYRIDOXINE) 100 MG tablet, Take 1 tablet by mouth Daily., Disp: 30 tablet, Rfl: 0  •  vitamin D3 (vitamin d) 125 MCG (5000 UT) capsule capsule, Take 5,000 Units by mouth Daily., Disp: , Rfl:     Allergies: No Known Allergies    The following portions of the patient's history were reviewed and updated as appropriate: allergies, current medications, past family history, past medical history, past social history, past surgical history and problem list.        Objective    Objective      Vital Signs:   Vitals:    02/03/22 0931   BP: 118/78   Weight: 79.6 kg (175 lb 7.8 oz)   Height: 168.9 cm (66.5\")       Ortho Exam:  Left shoulder active and passive range of motion improved excellent strength    Results Review:  Imaging Results (Last 24 Hours)     ** No " results found for the last 24 hours. 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    Follow Up: AVI Farias MD, FAAOS  Orthopedic Surgeon  Fellowship Trained Shoulder and Elbow Surgeon  [facility]  Orthopedics and Sports Medicine  [address] [place] [address], [address]  [place] [address]    02/03/22  10:25 EST

## 2022-02-14 DIAGNOSIS — M54.12 RADICULOPATHY, CERVICAL REGION: Primary | ICD-10-CM

## 2022-02-25 ENCOUNTER — TELEPHONE (OUTPATIENT)
Dept: NEUROSURGERY | Facility: CLINIC | Age: 52
End: 2022-02-25

## 2022-02-25 NOTE — TELEPHONE ENCOUNTER
Patient has upcoming appointment with Dr. Farnsworth on 02/28/2022.    Patient no showed her Injection with Dr. Robles, and has not completed PT at this time. Please have patient r/s when she has these complete.

## 2022-02-28 ENCOUNTER — OUTSIDE FACILITY SERVICE (OUTPATIENT)
Dept: PAIN MEDICINE | Facility: CLINIC | Age: 52
End: 2022-02-28

## 2022-02-28 PROCEDURE — 99152 MOD SED SAME PHYS/QHP 5/>YRS: CPT | Performed by: ANESTHESIOLOGY

## 2022-02-28 PROCEDURE — 64479 NJX AA&/STRD TFRM EPI C/T 1: CPT | Performed by: ANESTHESIOLOGY

## 2022-03-01 ENCOUNTER — TELEPHONE (OUTPATIENT)
Dept: PAIN MEDICINE | Facility: CLINIC | Age: 52
End: 2022-03-01

## 2022-03-01 NOTE — TELEPHONE ENCOUNTER
Spoke with pt regarding yesterday’s procedure with Dr. Robles. Pt stated they are doing well, with a bit of neck pain down to her shoulder, but no headache today. Advised of f/u appointment. Pt understood, and no further needs expressed

## 2022-03-02 ENCOUNTER — TREATMENT (OUTPATIENT)
Dept: PHYSICAL THERAPY | Facility: CLINIC | Age: 52
End: 2022-03-02

## 2022-03-02 DIAGNOSIS — M54.12 RADICULOPATHY, CERVICAL: Primary | ICD-10-CM

## 2022-03-02 PROCEDURE — 97164 PT RE-EVAL EST PLAN CARE: CPT | Performed by: PHYSICAL THERAPIST

## 2022-03-02 PROCEDURE — 97110 THERAPEUTIC EXERCISES: CPT | Performed by: PHYSICAL THERAPIST

## 2022-03-02 NOTE — PROGRESS NOTES
Physical Therapy Initial Evaluation and Plan of Care      Patient: Angeles Viera   : 1970  Diagnosis/ICD-10 Code:  The encounter diagnosis was Radiculopathy, cervical.   Referring practitioner: Sammy Farnsworth MD  Date of Initial Visit: Type: THERAPY  Noted: 2021  Today's Date: 3/2/2022  Patient seen for 3 sessions         Visit Diagnoses:    ICD-10-CM ICD-9-CM   1. Radiculopathy, cervical  M54.12 723.4       Subjective Questionnaire: NDI:42%, Quick DASH 31.82    Subjective Evaluation    History of Present Illness  Onset date: 2021.  Mechanism of injury: Pt describes sudden, yet insidious onset of symptoms.  L shoulder pain began in about mid-April.  She awoke one morning and could not move her arm.  L UE tingling began in late April or early May.  She does not recall any unusual activity which may have contributed to symptom onset.    She recently moved her aging parents into her home and is helping care for them.  This takes a significant amount of effort on her part and limits her ability to focus on caring for herself.    Pt was seen here for PT in December, but she was unable to follow up due to a series of events including illness and inclement weather.  She returns with new order from new provider and is ready to resume care.     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  She enjoys decorating and crafts, but has very limited leisure time. Pain  Current pain ratin  At best pain rating: 3  At worst pain ratin  Location: L neck, shoulder,arm, hand  Quality: throbbing, tight, cramping, discomfort and radiating (tingling in fingers)  Relieving factors: rest  Aggravating factors: lifting, movement, sleeping and prolonged positioning (laying on left side)  Progression: improved    Social Support  Lives in: multiple-level home  Lives with: spouse and parents    Hand  dominance: right    Diagnostic Tests  MRI studies: abnormal (see EMR)    Treatments  Previous treatment: injection treatment and physical therapy  Patient Goals  Patient goals for therapy: decreased pain and increased motion           Treatment                Objective        Special Questions  Patient is experiencing headaches.       Postural Observations  Seated posture: fair  Standing posture: fair  Correction of posture: makes symptoms worse        Neurological Testing     Sensation   Cervical/Thoracic   Left   Paresthesia: light touch    Right   Intact: light touch    Comments   Left light touch: first three digits of L hand.     Reflexes   Left   Biceps (C5/C6): normal (2+)  Brachioradialis (C6): normal (2+)  Triceps (C7): normal (2+)    Active Range of Motion   Cervical/Thoracic Spine   Cervical    Subcranial retraction: restricted and with pain   Flexion: 40 (L UT pain) degrees   Extension: 43 (L UT pull/stretch) degrees   Left lateral flexion: 25 (feels like it wants to pop) degrees   Right lateral flexion: 30 (pulls left side of neck) degrees   Left rotation: 36 (L pain) degrees   Right rotation: 50 (no pain) degrees   Left Shoulder   Flexion: 142 degrees   Abduction: 97 degrees   External rotation 90°: 60 degrees   External rotation BTH: T3   Internal rotation 90°: 47 degrees   Internal rotation BTB: T12     Strength/Myotome Testing   Cervical Spine     Right   Normal strength    Left Shoulder     Planes of Motion   Flexion: 4+   Abduction: 4   External rotation at 0°: 4+   Internal rotation at 0°: 4+     Left Elbow   Flexion: 5  Extension: 5    Left Wrist/Hand   Wrist extension: 5  Wrist flexion: 5     (2nd hand position)     Trial 1: 50 lbs    Trial 2: 45 lbs    Trial 3: 45 lbs    Average: 46.67 lbs          Assessment & Plan     Assessment  Impairments: abnormal or restricted ROM, activity intolerance, impaired physical strength, lacks appropriate home exercise program and pain with  function  Functional Limitations: carrying objects, lifting, sleeping, uncomfortable because of pain, reaching behind back, reaching overhead and unable to perform repetitive tasks  Assessment details: Pt presents with 11 month history of pain and limited motion in left neck and L shoulder, as well as L UE paresthesia into first three fingers of L hand, radial forearm, upper arm intermittently.  L shoulder motion restrictions have improved since initial evaluation in December 2021. Other L UE symptoms are consistent with cervical radiculopathy.  She has had NCV/EMG showing chronic C5-6 radiculopathy.  She had NED to L neck 2 days ago and has some residual soreness from that.  Prognosis: good    Goals  Plan Goals: 4 weeks  1) Pt. demonstrates independence and compliance with initial HEP.  2) Pt. reports reduction in pain intensity to no worse than 5/10 on NPRS.  3) AROM of neck and L shoulder shows improvement over baseline measures.  4) NDI score is improved by 8%, indicating improving functional abilities.  5) Quick DASH score is improved by 8 points, indicating improving functional abilities.    8 weeks  1) Pt. demonstrates independence in advanced HEP for ongoing improvement.  2) AROM of neck and L shoulder is sufficient for performance of daily activities.  3) Average L  strength is improved to >/= 50#.  4) Pt reports increasing tolerance for daily household and self-care activities with improvement in functional outcome scores, NDI <30%, Quick DASH 20 or less.        Plan  Therapy options: will be seen for skilled therapy services  Planned modality interventions: cryotherapy and thermotherapy (hydrocollator packs)  Planned therapy interventions: abdominal trunk stabilization, manual therapy, neuromuscular re-education, postural training, soft tissue mobilization, strengthening, spinal/joint mobilization, stretching, therapeutic activities, joint mobilization, home exercise program, functional ROM exercises,  flexibility and body mechanics training  Frequency: 1x week  Duration in weeks: 8  Treatment plan discussed with: patient  Plan details: PT weekly per POC, addressing pain and noted deficits in L UE function and L neck and shoulder mobility.            Timed:  Manual Therapy:         mins  52236;  Therapeutic Exercise:    8     mins  83467;     Neuromuscular Edgar:        mins  91210;    Therapeutic Activity:          mins  83559;     Gait Training:           mins  05989;     Ultrasound:          mins  36286;    Electrical Stimulation:         mins  55501 ( );  Iontophoresis  ___ mins   64180    Untimed:  Electrical Stimulation:         mins  80071 ( );  Mechanical Traction:         mins  80750;     Timed Treatment:   8   mins   Total Treatment:     38   mins    PT SIGNATURE: Olive Low PT   Electronically signed  DATE TREATMENT INITIATED: 3/2/2022    Initial Certification  Certification Period: 3/2/6691nmpw8/30/2022  I certify that the therapy services are furnished while this patient is under my care.  The services outlined above are required by this patient, and will be reviewed every 90 days.    Physician Signature:_____________________________________________             PHYSICIAN:  Sammy Farnsworth MD  NPI:                                       DATE:       Please sign and return via fax to [phone].. Thank you, [facility].

## 2022-03-09 ENCOUNTER — TREATMENT (OUTPATIENT)
Dept: PHYSICAL THERAPY | Facility: CLINIC | Age: 52
End: 2022-03-09

## 2022-03-09 DIAGNOSIS — M54.12 RADICULOPATHY, CERVICAL: Primary | ICD-10-CM

## 2022-03-09 DIAGNOSIS — M75.02 ADHESIVE CAPSULITIS OF LEFT SHOULDER: ICD-10-CM

## 2022-03-09 PROCEDURE — 97110 THERAPEUTIC EXERCISES: CPT | Performed by: PHYSICAL THERAPIST

## 2022-03-09 PROCEDURE — 97140 MANUAL THERAPY 1/> REGIONS: CPT | Performed by: PHYSICAL THERAPIST

## 2022-03-09 NOTE — PROGRESS NOTES
Physical Therapy Daily Progress Note  VISIT: 4          Subjective    Angeles Viera reports: She is in a lot of pain this morning.  She says she has a migraine and her neck feels very stiff.  She has had a lot of stress at home, including the death of a neighbor, and she continues to care for her aging parents in her home.  She has not had much time to do her exercises.      Pre-treatment pain:  9  Post-treatment pain:  5      Objective    Treatment    Exercise 1  Exercise Name 1: supine over horizontal towel roll for thoracic extension stretch  Exercise 2  Exercise Name 2: alternating B UE flexion  Exercise 3  Exercise Name 3: supine horizontal abduction  Exercise 4  Exercise Name 4: seated thoracic extension  Exercise 5  Exercise Name 5: scapular retraction squeeze    Manual Rx 1  Manual Rx 1 Location: cervical spine  Manual Rx 1 Type: B side glides, PA mid-upper cervical glide  Manual Rx 2  Manual Rx 2 Location: B shoulders  Manual Rx 2 Type: scapular retraction and depression, passive pectoral stretch            Assessment & Plan     Assessment    Assessment details: Pt initially presented with very guarded motion in cervicothoracic and scapular region.  After treatment, she appeared more relaxed, less guarded, and reported significant reduction in pain.  Stress is a contributing factor, as patient is caring for both of her parents who have dementia.    Plan  Plan details: Continue PT.  Progress or modify exercises based on symptom response.               Timed:  Manual Therapy:    24     mins  64359;  Therapeutic Exercise:    20     mins  28440;     Neuromuscular Edgar:        mins  35592;    Therapeutic Activity:          mins  50037;     Gait Training:           mins  82666;     Ultrasound:          mins  53596;    Electrical Stimulation:         mins  94066 ( );    Untimed:  Electrical Stimulation:         mins  80894 ( );  Mechanical Traction:         mins  44308;     Timed Treatment:   44    mins   Total Treatment:     44   mins      Olive Low, PT  Physical Therapist

## 2022-03-16 ENCOUNTER — TREATMENT (OUTPATIENT)
Dept: PHYSICAL THERAPY | Facility: CLINIC | Age: 52
End: 2022-03-16

## 2022-03-16 DIAGNOSIS — M75.02 ADHESIVE CAPSULITIS OF LEFT SHOULDER: ICD-10-CM

## 2022-03-16 DIAGNOSIS — M54.12 RADICULOPATHY, CERVICAL: Primary | ICD-10-CM

## 2022-03-16 PROCEDURE — 97110 THERAPEUTIC EXERCISES: CPT | Performed by: PHYSICAL THERAPIST

## 2022-03-16 PROCEDURE — 97140 MANUAL THERAPY 1/> REGIONS: CPT | Performed by: PHYSICAL THERAPIST

## 2022-03-16 NOTE — PROGRESS NOTES
Physical Therapy Daily Progress Note  VISIT: 5          Subjective    Angeles AMARAL Viera reports: she has been up since 2:30am with her parents who live with her.  She notices tightness in her neck and left shoulder.  She has tension at the base of top of her neck.      Pre-treatment pain:  3  Post-treatment pain:  2      Objective    Treatment    Exercise 1  Exercise Name 1: supine over horizontal towel roll for thoracic extension stretch  Exercise 2  Exercise Name 2: B UE flexion with wand  Exercise 3  Exercise Name 3: supine horizontal abduction with band  Equipment/Resistance 3: yellow band  Exercise 4  Exercise Name 4: supine diagonal shoudler flexion with band  Equipment/Resistance 4: yellow band  Exercise 5  Exercise Name 5: prone scapular retraction  Exercise 6  Exercise Name 6: prone chin tuck with thoracic extension lift  Exercise 7  Exercise Name 7: low row with band  Equipment/Resistance 7: yellow band  Exercise 8  Exercise Name 8: lat pull with band  Equipment/Resistance 8: yellow band    Manual Rx 1  Manual Rx 1 Location: cervical spine  Manual Rx 1 Type: L to R side glides, manual traction  Manual Rx 2  Manual Rx 2 Location: B shoulders  Manual Rx 2 Type: scapular retraction and depression, passive pectoral stretch  Manual Rx 3  Manual Rx 3 Location: thoracic spine  Manual Rx 3 Type: PA mobilization in prone, passive scapular retraction/depression            Assessment & Plan     Assessment    Assessment details: Pt reported reduction in pain after treatment today.  She responds well to postural correction exercises.    Plan  Plan details: Continue PT.  Progress exercises as tolerated.               Timed:  Manual Therapy:    15     mins  65370;  Therapeutic Exercise:    25     mins  61425;     Neuromuscular Edgar:        mins  90044;    Therapeutic Activity:          mins  71024;     Gait Training:           mins  03252;     Ultrasound:          mins  18514;    Electrical Stimulation:         mins   05626 ( );    Untimed:  Electrical Stimulation:         mins  14315 ( );  Mechanical Traction:         mins  02989;     Timed Treatment:   40   mins   Total Treatment:     40   mins      Olive Low PT  Physical Therapist

## 2022-03-22 DIAGNOSIS — M54.12 RADICULOPATHY, CERVICAL REGION: ICD-10-CM

## 2022-03-22 RX ORDER — TIZANIDINE 2 MG/1
1-2 TABLET ORAL 3 TIMES DAILY PRN
Qty: 30 TABLET | Refills: 0 | Status: SHIPPED | OUTPATIENT
Start: 2022-03-22 | End: 2022-04-26 | Stop reason: SDUPTHER

## 2022-03-23 ENCOUNTER — TREATMENT (OUTPATIENT)
Dept: PHYSICAL THERAPY | Facility: CLINIC | Age: 52
End: 2022-03-23

## 2022-03-23 DIAGNOSIS — M54.12 RADICULOPATHY, CERVICAL: Primary | ICD-10-CM

## 2022-03-23 PROCEDURE — 97140 MANUAL THERAPY 1/> REGIONS: CPT | Performed by: PHYSICAL THERAPIST

## 2022-03-23 PROCEDURE — 97110 THERAPEUTIC EXERCISES: CPT | Performed by: PHYSICAL THERAPIST

## 2022-03-23 NOTE — PROGRESS NOTES
Physical Therapy Daily Progress Note  VISIT: 6          Subjective    Angeles Viera reports: migraine for the past 2 days, subsiding      Pre-treatment pain:  6  Post-treatment pain:  4      Objective    Treatment    Exercise 1  Exercise Name 1: supine over horizontal towel roll for thoracic extension stretch  Exercise 2  Exercise Name 2: B UE flexion with wand  Exercise 3  Exercise Name 3: supine horizontal abduction with band  Equipment/Resistance 3: yellow band  Exercise 4  Exercise Name 4: supine diagonal shoudler flexion with band  Equipment/Resistance 4: yellow band  Exercise 5  Exercise Name 5: supine IR/ER with hands behind head  Exercise 6  Exercise Name 6: prone chin tuck with thoracic extension lift  Exercise 7  Exercise Name 7: seated scapular protraction/retraction  Exercise 8  Exercise Name 8: seated scapular elevation/depression  Exercise 9  Exercise Name 9: seated thoracic extension    Manual Rx 1  Manual Rx 1 Location: cervical spine  Manual Rx 1 Type: L to R side glides, manual traction  Manual Rx 2  Manual Rx 2 Location: B shoulders  Manual Rx 2 Type: scapular retraction and depression, passive pectoral stretch  Manual Rx 3  Manual Rx 3 Location: thoracic spine  Manual Rx 3 Type: PA mobilization in prone, passive scapular retraction/depression            Assessment & Plan     Assessment    Assessment details: Pt reported reduction in pain after treatment today.  She is not sleeping well and is under tremendous stress trying to care for both her parents in her home.  Both have demantia.  She reports L hand N&T in median nerve distribution.  She has (+) Tinel's and (+) Phalen's tests.  No change in symptoms with cervical flexion toward or away from L side.  She has wrist splint.  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    Plan  Plan details: Continue PT addressing postural strength and CT mobility/stability.                Timed:  Manual Therapy:    15     mins  10617;  Therapeutic Exercise:    25     mins  27178;     Neuromuscular Edgar:        mins  85537;    Therapeutic Activity:          mins  37111;     Gait Training:           mins  57341;     Ultrasound:          mins  75657;    Electrical Stimulation:         mins  29025 ( );    Untimed:  Electrical Stimulation:         mins  47697 ( );  Mechanical Traction:         mins  22561;     Timed Treatment:   40   mins   Total Treatment:     40   mins      Olive Low PT  Physical Therapist

## 2022-03-24 ENCOUNTER — OFFICE VISIT (OUTPATIENT)
Dept: NEUROSURGERY | Facility: CLINIC | Age: 52
End: 2022-03-24

## 2022-03-24 VITALS
WEIGHT: 167 LBS | TEMPERATURE: 97.5 F | HEIGHT: 67 IN | SYSTOLIC BLOOD PRESSURE: 118 MMHG | DIASTOLIC BLOOD PRESSURE: 62 MMHG | BODY MASS INDEX: 26.21 KG/M2

## 2022-03-24 DIAGNOSIS — M50.30 DDD (DEGENERATIVE DISC DISEASE), CERVICAL: ICD-10-CM

## 2022-03-24 DIAGNOSIS — M54.12 CERVICAL RADICULOPATHY: Primary | ICD-10-CM

## 2022-03-24 PROCEDURE — 99213 OFFICE O/P EST LOW 20 MIN: CPT | Performed by: NEUROLOGICAL SURGERY

## 2022-03-24 NOTE — PATIENT INSTRUCTIONS
Things discussed during today's visit:    We are going to try more conservative methods for an additional 90 days. This will include more physical therapy, injections, and possible dry needing.  Call Dr. Cervantes's office for an appointment regarding your headaches  You will follow up in our office in 90 days with one of Dr. Farnsworth's PA.

## 2022-03-24 NOTE — PROGRESS NOTES
NAME: RODRIGO OJEDA   DOS: 3/24/2022  : 1970  PCP: Yolie Valiente PA    Chief Complaint:    Chief Complaint   Patient presents with   • Neck & Left Arm Pain       History of Present Illness:  52 y.o. female   I saw this lady in neurosurgical follow-up for the presence of C5 C4 degenerative disc she status post orthopedic visit is here for evaluation she had worsening pain in her neck she denies classic signs of radiculopathy she got reasonable range of motion but has adhesive capsulitis of the left shoulder she is here for evaluation    PMHX  Allergies:  No Known Allergies  Medications    Current Outpatient Medications:   •  albuterol sulfate  (90 Base) MCG/ACT inhaler, , Disp: , Rfl:   •  Alpha Lipoic Acid 200 MG capsule, Take 2 capsules (400mg) by mouth 3 (Three) Times a Day., Disp: 180 capsule, Rfl: 5  •  aspirin 81 MG EC tablet, Take 81 mg by mouth Daily., Disp: , Rfl:   •  CBD (cannabidiol) oral oil, Take 1 drop by mouth Every Night., Disp: , Rfl:   •  diclofenac (VOLTAREN) 50 MG EC tablet, TAKE 1 TABLET BY MOUTH 2 (TWO) TIMES A DAY., Disp: 60 tablet, Rfl: 0  •  Dietary Management Product (Rheumate) capsule, Take 1 capsule by mouth Daily., Disp: 90 capsule, Rfl: 1  •  docusate sodium 100 MG capsule, Take 100 mg by mouth 2 (Two) Times a Day., Disp: 20 each, Rfl: 0  •  Farxiga 5 MG tablet tablet, , Disp: , Rfl:   •  Fluticasone Furoate-Vilanterol (Breo Ellipta) 100-25 MCG/INH inhaler, Inhale 1 puff Daily., Disp: , Rfl:   •  Gel Base gel, 2 g 4 (Four) Times a Day. prilocaine 2%, lidocaine 10%, imipramine 3%, capsaicin 0.001% and mannitol 20%, Disp: 240 g, Rfl: 5  •  montelukast (SINGULAIR) 10 MG tablet, , Disp: , Rfl:   •  nortriptyline (PAMELOR) 10 MG capsule, Take 1-2 capsules by mouth every night at bedtime., Disp: 60 capsule, Rfl: 1  •  omeprazole (priLOSEC) 40 MG capsule, , Disp: , Rfl:   •  pregabalin (LYRICA) 75 MG capsule, TAKE 1 CAPSULE BY MOUTH 2 (TWO) TIMES A DAY., Disp: 60 capsule,  Rfl: 0  •  promethazine (PHENERGAN) 25 MG tablet, , Disp: , Rfl:   •  tiZANidine (ZANAFLEX) 2 MG tablet, TAKE 0.5-1 TABLETS BY MOUTH 3 (THREE) TIMES A DAY AS NEEDED FOR MUSCLE SPASMS, Disp: 30 tablet, Rfl: 0  •  vitamin B-6 (PYRIDOXINE) 100 MG tablet, Take 1 tablet by mouth Daily., Disp: 30 tablet, Rfl: 0  •  vitamin D3 125 MCG (5000 UT) capsule capsule, Take 5,000 Units by mouth Daily., Disp: , Rfl:   Past Medical History:  Past Medical History:   Diagnosis Date   • Abdominal hernia    • Bronchitis    • COVID 01/19/2022   • Diabetes mellitus (HCC)     pre diabetic, dieting controlled and using cpd oil   • Hashimoto's disease    • Hypertension      Past Surgical History:  Past Surgical History:   Procedure Laterality Date   • APPENDECTOMY     • COLONOSCOPY     • FOOT SURGERY Left    • HERNIA REPAIR      abdominal   • HYSTERECTOMY     • TONSILLECTOMY     • VENTRAL/INCISIONAL HERNIA REPAIR N/A 3/21/2019    Procedure: OPEN INCISIONAL HERNIA REPAIR WITH MESH, BILATERAL SEPERATION OF COMPONENTS;  Surgeon: Daniel Cullen MD;  Location: [facility];  Service: General     Social Hx:  Social History     Tobacco Use   • Smoking status: Never Smoker   • Smokeless tobacco: Never Used   Vaping Use   • Vaping Use: Never used   Substance Use Topics   • Alcohol use: Yes     Comment: occastional   • Drug use: No     Family Hx:  Family History   Problem Relation Age of Onset   • Hyperlipidemia Mother    • Diabetes Mother    • Diabetes Father    • Hyperlipidemia Father    • Asthma Brother      Review of Systems:        Review of Systems   Constitutional: Positive for fatigue. Negative for activity change, appetite change, chills, diaphoresis, fever and unexpected weight change.   HENT: Negative for congestion, dental problem, drooling, ear discharge, ear pain, facial swelling, hearing loss, mouth sores, nosebleeds, postnasal drip, rhinorrhea, sinus pressure, sinus pain, sneezing, sore throat, tinnitus, trouble swallowing and voice  change.    Eyes: Negative for photophobia, pain, discharge, redness, itching and visual disturbance.   Respiratory: Negative for apnea, cough, choking, chest tightness, shortness of breath, wheezing and stridor.    Cardiovascular: Negative for chest pain, palpitations and leg swelling.   Gastrointestinal: Positive for constipation. Negative for abdominal distention, abdominal pain, anal bleeding, blood in stool, diarrhea, nausea, rectal pain and vomiting.   Endocrine: Negative for cold intolerance, heat intolerance, polydipsia, polyphagia and polyuria.   Genitourinary: Negative for decreased urine volume, difficulty urinating, dyspareunia, dysuria, enuresis, flank pain, frequency, genital sores, hematuria, menstrual problem, pelvic pain, urgency, vaginal bleeding, vaginal discharge and vaginal pain.   Musculoskeletal: Positive for neck pain and neck stiffness. Negative for arthralgias, back pain, gait problem, joint swelling and myalgias.   Skin: Negative for color change, pallor, rash and wound.   Allergic/Immunologic: Positive for environmental allergies. Negative for food allergies and immunocompromised state.   Neurological: Positive for headaches. Negative for dizziness, tremors, seizures, syncope, facial asymmetry, speech difficulty, weakness, light-headedness and numbness.   Hematological: Negative for adenopathy. Does not bruise/bleed easily.   Psychiatric/Behavioral: Negative for agitation, behavioral problems, confusion, decreased concentration, dysphoric mood, hallucinations, self-injury, sleep disturbance and suicidal ideas. The patient is nervous/anxious. The patient is not hyperactive.       I have reviewed this note template and all pertinent parts of the review of systems social, family history, surgical history and medication list      Physical Examination:  Vitals:    03/24/22 1057   BP: 118/62   Temp: 97.5 °F (36.4 °C)      General Appearance:   Well developed, well nourished, well groomed, alert,  and cooperative.  Neurological examination:  Neurologic Exam  Shoulder is a bit looser today with the injection  She has good strength in her upper extremities  Brisk reflexes biceps bilaterally with good C5 strength  No Gonzalez's clonus long tract signs  No evidence of clonus in the lower extremities gait and station is normal    Review of Imaging/DATA:  MRI was reviewed that shows C4-5 degenerative changes the spinal cord is quite open  Diagnoses/Plan:    Ms. Viera is a 52 y.o. female   1.  Complex arthritic symptomatology multifactorial neck and left shoulder pain she had improvement with the shoulder pain since last visit with an injection consistent with adhesive capsulitis  Today she has marked decreased range of motion of the neck but absence of C5 radicular symptoms despite the fact her MRI was personally reviewed demonstrates the presence of probably a small disc bulge at the left C4-5 area    I explained the role for surgery is potentially last option for her from now until then she needs to follow-up    Additional injections  Continue physical therapy  Continue therapy for frozen shoulder    I like her to see one of my PAs in about 90 days with a repeat EMG nerve conduction study and cervical flexion-extension films of the symptoms are consistent and she still miserable she has EMG confirmatory C5 radicular disease I would consider cervical myelogram for confirmatory and a follow-up I explained all that to her today    Additionally a consultant rheumatologist may be in order to address some of her multiple arthritic and pain symptoms

## 2022-03-30 ENCOUNTER — TREATMENT (OUTPATIENT)
Dept: PHYSICAL THERAPY | Facility: CLINIC | Age: 52
End: 2022-03-30

## 2022-03-30 DIAGNOSIS — M75.02 ADHESIVE CAPSULITIS OF LEFT SHOULDER: ICD-10-CM

## 2022-03-30 DIAGNOSIS — M54.12 RADICULOPATHY, CERVICAL: Primary | ICD-10-CM

## 2022-03-30 PROCEDURE — 97140 MANUAL THERAPY 1/> REGIONS: CPT | Performed by: PHYSICAL THERAPIST

## 2022-03-30 PROCEDURE — 97110 THERAPEUTIC EXERCISES: CPT | Performed by: PHYSICAL THERAPIST

## 2022-03-30 NOTE — PROGRESS NOTES
"      Physical Therapy Daily Progress Note  VISIT: 7          Subjective    Angeles Viera reports: she has a mild headache, but has less pain than at last session.      Pre-treatment pain:  3  Post-treatment pain:  2      Objective    Treatment    Exercise 1  Exercise Name 1: UBE  Time: 5'  Exercise 2  Exercise Name 2: supine thoracic extension over towel roll  Exercise 3  Exercise Name 3: supine horizontal abduction with band  Equipment/Resistance 3: yellow band  Exercise 4  Exercise Name 4: supine diagonal shoudler flexion with band  Equipment/Resistance 4: yellow band  Exercise 5  Exercise Name 5: supine chin tuck on towel roll  Exercise 6  Exercise Name 6: standing chin tuck with back to wall  Exercise 7  Exercise Name 7: \"robbery\" scap squeeze  Exercise 8  Exercise Name 8: supine snow chirag stretch    Manual Rx 1  Manual Rx 1 Location: cervical spine  Manual Rx 1 Type: suboccipital release, OA flexion mobilization/stretch  Manual Rx 2  Manual Rx 2 Location: B shoulders  Manual Rx 2 Type: scapular retraction and depression, passive pectoral stretch            Assessment & Plan     Assessment    Assessment details: Pt reported decrease in headache intensity after session today.  She demonstrates improving scapular mobility and L shoulder mobility.    Plan  Plan details: Continue PT.  Progress exercise as tolerated.               Timed:  Manual Therapy:    10     mins  64950;  Therapeutic Exercise:    30     mins  11992;     Neuromuscular Edgar:        mins  48983;    Therapeutic Activity:          mins  79418;     Gait Training:           mins  10316;     Ultrasound:          mins  43917;    Electrical Stimulation:         mins  34447 ( );    Untimed:  Electrical Stimulation:         mins  73305 ( );  Mechanical Traction:         mins  33310;     Timed Treatment:   40   mins   Total Treatment:     40   mins      Olive Low, PT  Physical Therapist                    "

## 2022-04-21 ENCOUNTER — DOCUMENTATION (OUTPATIENT)
Dept: PHYSICAL THERAPY | Facility: CLINIC | Age: 52
End: 2022-04-21

## 2022-04-21 NOTE — PROGRESS NOTES
Discharge Summary  Discharge Summary from Physical Therapy Report      Patient: Angeles Viera   : 1970  Diagnosis/ICD-10 Code:  There were no encounter diagnoses.   Referring practitioner: No ref. provider found  Date of Initial Visit: No linked episodes  Today's Date: 2022  Date of Last Visit: 3-     Number of Visits: 5    Discharge Status of Patient: Pt elected to continue with independent HEP as she is caring for both of her parents in her home.    Goals: Partially Met    Discharge Plan: Continue with current home exercise program as instructed      Date of Discharge: 2022        Olive Low, PT

## 2022-04-25 NOTE — PROGRESS NOTES
"Chief Complaint: \"My neck is better, I still have left arm pain.\"        History of Present Illness:   Patient: Ms. Angeles Viera, 52 y.o. female originally referred by Dr. Sammy Farnsworth in consultation for chronic intractable neck, left shoulder and left upper extremity pain.  Patient reports a longstanding history of chronic neck pain, which began without incident.  More recently.  She began having some left shoulder pain with decreased range of motion.  She did see Dr. Blair Farias with orthopedics and was diagnosed with adhesive capsulitis of the left shoulder.  We last saw her on February 28, 2022, when she underwent diagnostic and therapeutic left C5-C6 transforaminal epidural steroid injection, from which she reports experiencing 50-60% pain relief and functional improvement that is ongoing.  Her symptoms are less severe and intense, she does continue with left upper extremity symptoms extending from her neck into her left hand.  She has been participating in physical therapy.  She returns today for post procedure follow-up and evaluation.  Pain Description: Constant pain with intermittent exacerbation, described as sharp, shooting, aching, and throbbing sensation.   Radiation of Pain: The pain radiates from the posterior cervical region into the right shoulder, and the left side into the left shoulder and down in the anterior and lateral aspect of pain left upper extremity and into her thumb, left index and left middle finger  Pain intensity today: 4/10  Average pain intensity last week: 4/10  Pain intensity ranges from: 3/10 to 7/10  Aggravating factors: Pain increases with extension, flexion, rotation of the cervical spine   Alleviating factors: Pain decreases with flexion of the cervical spine, lying down with a pillow under neck  Associated Symptoms:   Patient reports pain, numbness, and weakness in the left upper extremity.   Patient denies any new bladder or bowel problems.   Patient denies " difficulties with her balance or recent falls.   Patient reports bilateral cervicogenic and occipital headaches 3 x per week lasting several hours.      Review of previous therapies and additional medical records:  Angeles Viera has already failed the following measures, including:   Conservative Measures: Oral analgesics, topical analgesics, home exercise program, physical therapy   Interventional Measures: Left glenohumeral joint injection with steroids on 12/2/2021 by Dr Farias.   02/28/2022: DxTx left C5-C6 transforaminal epidural steroid injection   Surgical Measures: No history of previous cervical spine or shoulder surgery   Anglees Viera underwent neurosurgical consultation with Dr. Sammy Farnsworth on 11/18/2021, and was found not to be a surgical candidate.  Patient underwent orthopedic surgical consultation with Dr. Blair Farias on 12/2/2021 and was diagnosed with adhesive capsulitis of the left shoulder.  Dr. Farias recommended conservative measures along with the performance of left glenohumeral joint injection with steroids on 12/2/2021.  Angeles Viera presents with significant comorbidities including diabetes mellitus, Hashimoto's disease, hypertension frozen shoulder  in terms of current analgesics, Angeles Viera takes: Lyrica, Voltaren. Patient also takes promethazine PRN  I have reviewed Daljit Report is consistent with medication reconciliation.  SOAPP: Low Risk     Global Pain Scale 01-04  2022 04-26 2022         Pain 15 12         Feelings 10 13         Clinical outcomes 7 15         Activities 7 15         GPS Total: 39 55           Review of Diagnostic Studies:    MRI of the cervical spine without contrast 9/20/2021: Imaging was reviewed.  Diffuse spondylosis with facet arthritis from C2-C3 through C6-C7.  Tumor body heights are well-maintained without evidence of malalignment.    C1-C2: Negative  C2-C3: Facet hypertrophy.  Mild left neuroforaminal stenosis  C3-C4: Disc osteophyte complex,  uncovertebral and facet joint hypertrophy.  Mild canal stenosis and moderate bilateral neuroforaminal stenosis  C4-C5: Uncovertebral and facet hypertrophy.  Mild left and moderate right neuroforaminal stenosis  C5-C6: Disc osteophyte complex, uncovertebral and facet joint hypertrophy.  Mild leftward canal stenosis.  Moderate left neuroforaminal stenosis  C6-C7: Disc osteophyte complex, uncovertebral and facet hypertrophy.  Mild canal stenosis.  Mild to moderate bilateral neuroforaminal stenosis  C7-T1: No significant canal or foraminal stenosis  MRI of the left shoulder with and without contrast 11/30/2021:  Mild AC joint hypertrophy.  No significant subacromial space stenosis.  Rotator cuff is intact.  The biceps and labrum are intact. There is no bony abnormality of the humeral head or glenoid.  EMG/NCV the bilateral upper extremities by Dr. Surinder Cervantes 8/31/2021: Mild chronic left C5-C6 radiculopathy.      Review of Systems   Constitutional: Positive for fatigue.   HENT: Positive for congestion.    Respiratory: Positive for cough.    Gastrointestinal: Positive for constipation.   Musculoskeletal: Positive for back pain, neck pain and neck stiffness.   Neurological: Positive for headaches.   Psychiatric/Behavioral: Positive for sleep disturbance. The patient is nervous/anxious.    All other systems reviewed and are negative.        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)   • Adhesive capsulitis of left shoulder   • Impingement syndrome of left shoulder   • Cervical spinal stenosis   • DDD (degenerative disc disease), cervical   • Cervical spondylosis without myelopathy   • Radiculopathy, cervical region   • Overweight with body mass index (BMI) of 27 to 27.9 in adult   • Muscle spasms of neck       Past Medical History:   Diagnosis Date   • Abdominal  hernia    • Bronchitis    • COVID 01/19/2022   • Diabetes mellitus (HCC)     pre diabetic, dieting controlled and using cpd oil   • Hashimoto's disease    • Hypertension          Past Surgical History:   Procedure Laterality Date   • APPENDECTOMY     • COLONOSCOPY     • FOOT SURGERY Left    • HERNIA REPAIR      abdominal   • HYSTERECTOMY     • TONSILLECTOMY     • VENTRAL/INCISIONAL HERNIA REPAIR N/A 3/21/2019    Procedure: OPEN INCISIONAL HERNIA REPAIR WITH MESH, BILATERAL SEPERATION OF COMPONENTS;  Surgeon: Daniel Cullen MD;  [facility];  Service: General         Family History   Problem Relation Age of Onset   • Hyperlipidemia Mother    • Diabetes Mother    • Diabetes Father    • Hyperlipidemia Father    • Asthma Brother          Social History     Socioeconomic History   • Marital status:    Tobacco Use   • Smoking status: Never Smoker   • Smokeless tobacco: Never Used   Vaping Use   • Vaping Use: Never used   Substance and Sexual Activity   • Alcohol use: Yes     Comment: occastional   • Drug use: No   • Sexual activity: Defer           Current Outpatient Medications:   •  albuterol sulfate  (90 Base) MCG/ACT inhaler, , Disp: , Rfl:   •  Alpha Lipoic Acid 200 MG capsule, Take 2 capsules (400mg) by mouth 3 (Three) Times a Day., Disp: 180 capsule, Rfl: 5  •  aspirin 81 MG EC tablet, Take 81 mg by mouth Daily., Disp: , Rfl:   •  CBD (cannabidiol) oral oil, Take 1 drop by mouth Every Night., Disp: , Rfl:   •  diclofenac (VOLTAREN) 50 MG EC tablet, TAKE 1 TABLET BY MOUTH 2 (TWO) TIMES A DAY., Disp: 60 tablet, Rfl: 0  •  Dietary Management Product (Rheumate) capsule, Take 1 capsule by mouth Daily., Disp: 90 capsule, Rfl: 1  •  docusate sodium 100 MG capsule, Take 100 mg by mouth 2 (Two) Times a Day., Disp: 20 each, Rfl: 0  •  Farxiga 5 MG tablet tablet, , Disp: , Rfl:   •  Fluticasone Furoate-Vilanterol (Breo Ellipta) 100-25 MCG/INH inhaler, Inhale 1 puff Daily., Disp: , Rfl:   •  Gel Base  "gel, 2 g 4 (Four) Times a Day. prilocaine 2%, lidocaine 10%, imipramine 3%, capsaicin 0.001% and mannitol 20%, Disp: 240 g, Rfl: 5  •  hydrOXYzine pamoate (VISTARIL) 25 MG capsule, , Disp: , Rfl:   •  montelukast (SINGULAIR) 10 MG tablet, , Disp: , Rfl:   •  nortriptyline (PAMELOR) 10 MG capsule, Take 1-2 capsules by mouth every night at bedtime., Disp: 60 capsule, Rfl: 1  •  omeprazole (priLOSEC) 40 MG capsule, , Disp: , Rfl:   •  pregabalin (LYRICA) 75 MG capsule, TAKE 1 CAPSULE BY MOUTH 2 (TWO) TIMES A DAY., Disp: 60 capsule, Rfl: 0  •  promethazine (PHENERGAN) 25 MG tablet, , Disp: , Rfl:   •  tiZANidine (ZANAFLEX) 2 MG tablet, Take 0.5-1 tablets by mouth 3 (Three) Times a Day As Needed for Muscle Spasms., Disp: 30 tablet, Rfl: 0  •  vitamin B-6 (PYRIDOXINE) 100 MG tablet, Take 1 tablet by mouth Daily., Disp: 30 tablet, Rfl: 0  •  vitamin D3 125 MCG (5000 UT) capsule capsule, Take 5,000 Units by mouth Daily., Disp: , Rfl:       No Known Allergies      /95   Pulse 113   Temp 96 °F (35.6 °C)   Ht 167.6 cm (66\")   Wt 76.2 kg (168 lb)   SpO2 98%   BMI 27.12 kg/m²       Physical Exam:  Constitutional: Patient appears well-developed, well-nourished, well-hydrated  HEENT: Head: Normocephalic and atraumatic  Eyes: Conjunctivae and lids are normal  Pupils: Equal, round, reactive to light  Neck: Trachea normal. Neck supple. No JVD present.   Lymphatic: No cervical adenopathy  Musculoskeletal   Gait and station: Gait evaluation demonstrated a normal gait.    Cervical spine: Passive and active range of motion are limited secondary to pain, but improved from prior.  Flexion, extension, lateral flexion, rotation of the cervical spine increased and reproduced pain. Cervical facet joint loading maneuvers are positive.  Muscles: Presence of active trigger points at the levator scapulae   Shoulders: The range of motion of the right glenohumeral joint is almost full and without pain. Rotator cuff strength is 5/5. On " the left, decreased active and passive ROM. Rotator cuff strength 4/5  Neurological:   Patient is alert and oriented to person, place, and time.   Speech: Normal.   Cortical function: Normal mental status.   Reflex Scores:  Right brachioradialis: 2+  Left brachioradialis: 2+  Right biceps: 2+  Left biceps: 2+  Right triceps: 2+  Left triceps: 2+  Right patellar: 2+  Left patellar: 2+  Right Achilles: 2+  Left Achilles: 2+  Motor strength: 5/5  Motor Tone: Normal  Involuntary movements: None.   Superficial/Primitive Reflexes: Primitive reflexes were absent.   Right Gonzalez: Absent  Left Gonzalez: Absent  Right ankle clonus: Absent  Left ankle clonus: Absent   Babinsky: Absent  Spurling sign: Positive. Neck tornado test: Positive. Lhermitte sign: Negative. Long tract signs: Negative.   Sensory exam: Intact to light touch, intact pain and temperature sensation, intact vibration sensation and normal proprioception.   Coordination: Normal finger to nose and heel to shin. Normal balance and negative Romberg's sign   Skin and subcutaneous tissue: Skin is warm and intact. No rash noted. No cyanosis.   Psychiatric: Judgment and insight: Normal. Recent and remote memory: Intact. Mood and affect: Normal.     ASSESSMENT:   1. Radiculopathy, cervical region    2. Cervical spondylosis without myelopathy    3. Cervical spinal stenosis    4. DDD (degenerative disc disease), cervical    5. Adhesive capsulitis of left shoulder    6. Diabetes mellitus type 2 in obese (HCC)    7. Anxiety          PLAN/MEDICAL DECISION MAKING: Patient reports a longstanding history of chronic neck pain.  More recently, she began having some left shoulder pain with decreased range of motion.  She did see Dr. Blair Farias with orthopedics and was diagnosed with adhesive capsulitis of the left shoulder. MRI of the cervical spine revealed multilevel cervical spondylosis with multilevel disc osteophyte complexes and facet hypertrophy particularly at C4-C5 and  C5-C6 with a left-sided intraforaminal disc herniation.  Electrodiagnostic studies of the upper extremities by Dr. Surinder Cervantes on 8/31/2021 were consistent with chronic left C5-6 radiculopathy.  Patient has failed to obtain pain relief with conservative measures including oral analgesics, physical therapy, to name a few. Angeles Viera underwent neurosurgical consultation with Dr. Sammy Farnsworth on 11/18/2021, and was found not to be a surgical candidate at that point.  She had a recent follow-up with Dr. Farnsworth on 3/24/2022, and had improvement in her radicular symptoms.  She does continue with decreased range of motion.  Dr. Farnsworth recommended a follow-up in 90 days, with repeat electrodiagnostic studies of the upper extremities, and if in fact a C5 radiculopathy continues to be consistent, he has recommended a cervical myelogram.  Patient does report improvement in her symptoms, unfortunately she does continue with left upper extremity pain, consistent with a continued C5-6 radiculopathy.  At this point, she does not feel she is in need of a another injection, she will keep me updated on her progress.  She does continue to have a significant component of mechanical neck pain, which attributes to her ongoing occipital headaches, although she is continuing with radicular symptoms.  Patient has failed to obtain pain relief with conservative measures, as referenced above. I have reviewed all available patient's medical records as well as previous therapies as referenced above. I had a lengthy conversation with Ms. Angeles Viera regarding her chronic pain condition and potential therapeutic options including risks, benefits, alternative therapies, to name a few. Therefore, I have proposed the following plan:  1. Diagnostic studies: Patient may need cervical myelogram followed by CT postmyelogram if she continues to struggle with pain  2. Pharmacological measures: Reviewed and discussed;   A. Patient takes Lyrica,  Voltaren. Patient also takes promethazine PRN  B. Continue nortriptyline 10 mg 1-2 tablets at bedtime  C. Continue tizanidine 2 mg 1/2 to 1 tablet 2 times a day as needed muscle spasm  D. Continue prilocaine 2%, lidocaine 10%, imipramine 3%, capsaicin 0.001% and mannitol 20% cream, apply 1 to 2 grams of cream to the affected areas every 4 to 6 hours as needed  3. Interventional pain management measures: None indicated at this time.  Follow-up on as-needed basis.  If pain becomes less tolerable we will repeat left C5-C6 transforaminal epidural steroid injection. We may repeat epidural depending on patient's outcome.  Patient will follow-up with Dr. Farnsworth thereafter to discuss possible cervical myelogram for surgical delineation.  In addition, patient continues to present with significant mechanical facetogenic pain of the cervical spine which could be addressed in the future, if improvement of her radicular symptoms.    4. Long-term rehabilitation efforts:  A. The patient does not have a history of falls. I did complete a risk assessment for falls  B. Patient will continue a comprehensive physical therapy program for upper body strengthening/posture correction, myofascial release, cupping and dry needlingposture/position body mechanics, range of motion/flexibility, joint mobilization   C. Start an exercise program such as yoga  D. Contrast therapy: Apply ice-packs for 15-20 minutes, followed by heating pads for 15-20 minutes to affected area   5. The patient and her family have been instructed to contact my office with any questions or difficulties. The patient understands the plan and agrees to proceed accordingly.      Patient Care Team:  Yolie Valiente PA as PCP - General (Family Medicine)     New Medications Ordered This Visit   Medications   • tiZANidine (ZANAFLEX) 2 MG tablet     Sig: Take 0.5-1 tablets by mouth 3 (Three) Times a Day As Needed for Muscle Spasms.     Dispense:  30 tablet     Refill:  0      03/22/2022 2:58:36 PM WALTER   • nortriptyline (PAMELOR) 10 MG capsule     Sig: Take 1-2 capsules by mouth every night at bedtime.     Dispense:  60 capsule     Refill:  1         Future Appointments   Date Time Provider Department Center   6/20/2022  9:30 AM Tommie Almeida PA-C [facility]         [facility]     Please note that portions of this note were completed with a voice recognition program.

## 2022-04-26 ENCOUNTER — OFFICE VISIT (OUTPATIENT)
Dept: PAIN MEDICINE | Facility: CLINIC | Age: 52
End: 2022-04-26

## 2022-04-26 VITALS
TEMPERATURE: 96 F | SYSTOLIC BLOOD PRESSURE: 128 MMHG | OXYGEN SATURATION: 98 % | BODY MASS INDEX: 27 KG/M2 | HEIGHT: 66 IN | WEIGHT: 168 LBS | DIASTOLIC BLOOD PRESSURE: 95 MMHG | HEART RATE: 113 BPM

## 2022-04-26 DIAGNOSIS — M50.30 DDD (DEGENERATIVE DISC DISEASE), CERVICAL: ICD-10-CM

## 2022-04-26 DIAGNOSIS — M75.02 ADHESIVE CAPSULITIS OF LEFT SHOULDER: ICD-10-CM

## 2022-04-26 DIAGNOSIS — E11.69 DIABETES MELLITUS TYPE 2 IN OBESE: ICD-10-CM

## 2022-04-26 DIAGNOSIS — F41.9 ANXIETY: ICD-10-CM

## 2022-04-26 DIAGNOSIS — M48.02 CERVICAL SPINAL STENOSIS: ICD-10-CM

## 2022-04-26 DIAGNOSIS — M54.12 RADICULOPATHY, CERVICAL REGION: ICD-10-CM

## 2022-04-26 DIAGNOSIS — M47.812 CERVICAL SPONDYLOSIS WITHOUT MYELOPATHY: ICD-10-CM

## 2022-04-26 DIAGNOSIS — E66.9 DIABETES MELLITUS TYPE 2 IN OBESE: ICD-10-CM

## 2022-04-26 PROCEDURE — 99213 OFFICE O/P EST LOW 20 MIN: CPT | Performed by: NURSE PRACTITIONER

## 2022-04-26 RX ORDER — TIZANIDINE 2 MG/1
1-2 TABLET ORAL 3 TIMES DAILY PRN
Qty: 30 TABLET | Refills: 0 | Status: SHIPPED | OUTPATIENT
Start: 2022-04-26 | End: 2022-07-05

## 2022-04-26 RX ORDER — HYDROXYZINE PAMOATE 25 MG/1
25 CAPSULE ORAL 3 TIMES DAILY PRN
COMMUNITY
Start: 2022-03-30

## 2022-04-26 RX ORDER — NORTRIPTYLINE HYDROCHLORIDE 10 MG/1
10-20 CAPSULE ORAL
Qty: 60 CAPSULE | Refills: 1 | Status: SHIPPED | OUTPATIENT
Start: 2022-04-26

## 2022-06-20 ENCOUNTER — PREP FOR SURGERY (OUTPATIENT)
Dept: OTHER | Facility: HOSPITAL | Age: 52
End: 2022-06-20

## 2022-06-20 ENCOUNTER — OFFICE VISIT (OUTPATIENT)
Dept: NEUROSURGERY | Facility: CLINIC | Age: 52
End: 2022-06-20

## 2022-06-20 VITALS
TEMPERATURE: 97.8 F | WEIGHT: 166.5 LBS | DIASTOLIC BLOOD PRESSURE: 82 MMHG | HEIGHT: 67 IN | SYSTOLIC BLOOD PRESSURE: 128 MMHG | BODY MASS INDEX: 26.13 KG/M2

## 2022-06-20 DIAGNOSIS — M54.12 RADICULOPATHY, CERVICAL REGION: Primary | ICD-10-CM

## 2022-06-20 DIAGNOSIS — M75.02 ADHESIVE CAPSULITIS OF LEFT SHOULDER: ICD-10-CM

## 2022-06-20 DIAGNOSIS — M48.02 CERVICAL SPINAL STENOSIS: Primary | ICD-10-CM

## 2022-06-20 PROCEDURE — 99214 OFFICE O/P EST MOD 30 MIN: CPT | Performed by: PHYSICIAN ASSISTANT

## 2022-06-20 NOTE — PROGRESS NOTES
Patient: Angeles Viera  : 1970    Primary Care Provider: Yolie Valiente PA      Chief Complaint: Bilateral shoulder pain    History of Present Illness:       Patient is a nice 52-year-old female with a very complicated medical history including frozen shoulder in the left.  Patient was initially seen with left shoulder pain by Dr. Farnsworth but noted to have a C4-5 canal stenosis.  Patient since then has been going through some physical therapy as well as pain management without much luck.    Actually patient is having right-sided shoulder pain as well at this point.  At last visit with Dr. Farnsworth 3 months ago he had recommended a myelogram as well as a EMG nerve conduction study if she failed to improve over the next 90 days.    I had a long discussion with her and her  about trying to filter out what is actually shoulder dysfunction as well as what is her neck.  This is going to be difficult secondary to the overlapping symptoms.    Patient admittedly does have quite a bit of intrinsic shoulder dysfunction with use and range of motion.    Review of Systems   Constitutional: Positive for fatigue. Negative for activity change, appetite change, chills, diaphoresis, fever and unexpected weight change.   HENT: Positive for voice change. Negative for congestion, dental problem, drooling, ear discharge, ear pain, facial swelling, hearing loss, mouth sores, nosebleeds, postnasal drip, rhinorrhea, sinus pressure, sinus pain, sneezing, sore throat, tinnitus and trouble swallowing.    Eyes: Negative for photophobia, pain, discharge, redness, itching and visual disturbance.   Respiratory: Positive for cough, shortness of breath and wheezing. Negative for apnea, choking, chest tightness and stridor.    Cardiovascular: Negative for chest pain, palpitations and leg swelling.   Gastrointestinal: Positive for constipation. Negative for abdominal distention, abdominal pain, anal bleeding, blood in stool, diarrhea,  nausea, rectal pain and vomiting.   Endocrine: Positive for polydipsia. Negative for cold intolerance, heat intolerance, polyphagia and polyuria.   Genitourinary: Negative for decreased urine volume, difficulty urinating, dyspareunia, dysuria, enuresis, flank pain, frequency, genital sores, hematuria, menstrual problem, pelvic pain, urgency, vaginal bleeding, vaginal discharge and vaginal pain.   Musculoskeletal: Positive for arthralgias, back pain, myalgias, neck pain and neck stiffness. Negative for gait problem and joint swelling.   Skin: Negative for color change, pallor, rash and wound.   Allergic/Immunologic: Positive for environmental allergies. Negative for food allergies and immunocompromised state.   Neurological: Positive for numbness and headaches. Negative for dizziness, tremors, seizures, syncope, facial asymmetry, speech difficulty, weakness and light-headedness.   Hematological: Negative for adenopathy. Does not bruise/bleed easily.   Psychiatric/Behavioral: Negative for agitation, behavioral problems, confusion, decreased concentration, dysphoric mood, hallucinations, self-injury, sleep disturbance and suicidal ideas. The patient is nervous/anxious. The patient is not hyperactive.        Past Medical History:     Past Medical History:   Diagnosis Date   • Abdominal hernia    • Bronchitis    • COVID 01/19/2022   • Diabetes mellitus (HCC)     pre diabetic, dieting controlled and using cpd oil   • Hashimoto's disease    • Hypertension        Family History:     Family History   Problem Relation Age of Onset   • Hyperlipidemia Mother    • Diabetes Mother    • Diabetes Father    • Hyperlipidemia Father    • Asthma Brother        Social History:    reports that she has never smoked. She has never used smokeless tobacco. She reports current alcohol use. She reports that she does not use drugs.   SMOKING STATUS: Non-smoker    Surgical History:     Past Surgical History:   Procedure Laterality Date   •  "APPENDECTOMY     • COLONOSCOPY     • FOOT SURGERY Left    • HERNIA REPAIR      abdominal   • HYSTERECTOMY     • TONSILLECTOMY     • VENTRAL/INCISIONAL HERNIA REPAIR N/A 3/21/2019    Procedure: OPEN INCISIONAL HERNIA REPAIR WITH MESH, BILATERAL SEPERATION OF COMPONENTS;  Surgeon: Daniel Cullen MD;  Location: [facility];  Service: General       Allergies:   Patient has no known allergies.    Physical Exam:    Vital Signs:/82 (BP Location: Right arm, Patient Position: Sitting, Cuff Size: Adult)   Temp 97.8 °F (36.6 °C) (Infrared)   Ht 168.9 cm (66.5\")   Wt 75.5 kg (166 lb 8 oz)   BMI 26.47 kg/m²    BMI: Body mass index is 26.47 kg/m².     GENERAL:           The patient is in no acute distress, and is able to answer all questions appropriately.    Neck:          Supple without lymphadenopathy    Cardiovascular:       Peripheral pulses 2+ at dorsalis pedis and posterior tibialis    Lungs:         Breathing unlabored    Musculoskeletal:            strength is 5 out of 5 bilaterally.        Shoulder abduction is 5 out of 5.  Causes pain bilaterally    Internal and external rotation as well as empty can test cause pain bilaterally         Dorsiflexion is 5/5 Bilaterally       Plantarflexion is 5/5 bilaterally       Hip Flexion 5/5 bilaterally.         The patient´s gait is normal without antalgia.    Neurologic:          The patient is alert and oriented by 3.          Pupils are equal and reactive to light.         Visual fields are full.         Extraocular movements are intact without nystagmus.         There is no evidence of central motor drift. No facial droop.  No difficulty with rapid alternating movements.         Sensation is equal bilaterally with no deficit.           Reflexes:  2+ through out    No Gonzalez's no clonus no long track signs    CRANIAL NERVES:         Cranial nerve II: Visual fields are full to confrontation.       Cranial nerves III, IV and VI: PERRLA DC.  Extraocular movements " are intact.  Nystagmus is not present.       Cranial nerve V: Facial sensation is intact to light touch.       Cranial nerve VII: Muscles of facial expression revealed no asymmetry.       Cranial nerve VIII: Hearing is intact to finger rub bilaterally.       Cranial nerve IX and X: Palate elevates symmetrically.        Cranial nerve XI: Shoulder shrug is intact.       Cranial nerve XII: Tongue is midline without evidence of Atrophy or fasciculation.    Medical Decision Making    Data Review:   MRI of the cervical spine reviewed showing right-sided C4-5 foraminal stenosis but nothing egregious in midline.    Diagnosis:   Cervical stenosis  Bilateral shoulder pain    Treatment Options:   We are to send the patient for myelogram as well as a post myelogram CT and EMG nerve conduction study.  After those images we will see her back to see if any of them would legitimize a surgical options.       BMI is >= 25 and <30. (Overweight) The following options were offered after discussion;: weight loss educational material (shared in after visit summary)     Diagnosis Plan   1. Radiculopathy, cervical region     2. Adhesive capsulitis of left shoulder

## 2022-07-05 DIAGNOSIS — M54.12 RADICULOPATHY, CERVICAL REGION: ICD-10-CM

## 2022-07-05 RX ORDER — TIZANIDINE 2 MG/1
1-2 TABLET ORAL 3 TIMES DAILY PRN
Qty: 30 TABLET | Refills: 0 | Status: SHIPPED | OUTPATIENT
Start: 2022-07-05

## 2022-07-07 ENCOUNTER — HOSPITAL ENCOUNTER (OUTPATIENT)
Facility: HOSPITAL | Age: 52
Setting detail: HOSPITAL OUTPATIENT SURGERY
Discharge: HOME | End: 2022-07-07
Attending: RADIOLOGY | Admitting: RADIOLOGY

## 2022-07-07 ENCOUNTER — APPOINTMENT (OUTPATIENT)
Dept: CT IMAGING | Facility: HOSPITAL | Age: 52
End: 2022-07-07

## 2022-07-07 ENCOUNTER — HOSPITAL ENCOUNTER (OUTPATIENT)
Dept: NEUROLOGY | Facility: HOSPITAL | Age: 52
Discharge: HOME | End: 2022-07-07

## 2022-07-07 VITALS
SYSTOLIC BLOOD PRESSURE: 144 MMHG | TEMPERATURE: 98.4 F | WEIGHT: 169.6 LBS | HEART RATE: 98 BPM | RESPIRATION RATE: 18 BRPM | OXYGEN SATURATION: 96 % | HEIGHT: 66 IN | DIASTOLIC BLOOD PRESSURE: 102 MMHG | BODY MASS INDEX: 27.26 KG/M2

## 2022-07-07 DIAGNOSIS — M54.12 RADICULOPATHY, CERVICAL REGION: ICD-10-CM

## 2022-07-07 DIAGNOSIS — M48.02 CERVICAL SPINAL STENOSIS: ICD-10-CM

## 2022-07-07 PROCEDURE — 72240 MYELOGRAPHY NECK SPINE: CPT | Performed by: RADIOLOGY

## 2022-07-07 PROCEDURE — 95886 MUSC TEST DONE W/N TEST COMP: CPT

## 2022-07-07 PROCEDURE — 95910 NRV CNDJ TEST 7-8 STUDIES: CPT

## 2022-07-07 PROCEDURE — 61055 INJECTION INTO BRAIN CANAL: CPT | Performed by: RADIOLOGY

## 2022-07-07 PROCEDURE — 25010000002 IOPAMIDOL 61 % SOLUTION: Performed by: RADIOLOGY

## 2022-07-07 PROCEDURE — 0 LIDOCAINE 1 % SOLUTION: Performed by: RADIOLOGY

## 2022-07-07 PROCEDURE — 72126 CT NECK SPINE W/DYE: CPT

## 2022-07-07 RX ORDER — LIDOCAINE HYDROCHLORIDE 10 MG/ML
INJECTION, SOLUTION INFILTRATION; PERINEURAL AS NEEDED
Status: DISCONTINUED | OUTPATIENT
Start: 2022-07-07 | End: 2022-07-07 | Stop reason: HOSPADM

## 2022-07-07 NOTE — DISCHARGE INSTRUCTIONS
1.) Take your cd to your appointment with you.  2.) Drink plenty of fluids today.  3.) No strenuous activity, do not lift anything over 20 lbs for 24 hours.

## 2022-07-14 DIAGNOSIS — M54.12 CERVICAL RADICULOPATHY: ICD-10-CM

## 2022-07-14 DIAGNOSIS — R20.0 LEFT ARM NUMBNESS: ICD-10-CM

## 2022-07-14 DIAGNOSIS — M47.812 CERVICAL SPONDYLOSIS: ICD-10-CM

## 2022-07-14 DIAGNOSIS — M50.30 BULGE OF CERVICAL DISC WITHOUT MYELOPATHY: ICD-10-CM

## 2022-07-14 RX ORDER — PREGABALIN 75 MG/1
75 CAPSULE ORAL 2 TIMES DAILY
Qty: 60 CAPSULE | Refills: 0 | Status: CANCELLED | OUTPATIENT
Start: 2022-07-14

## 2022-07-14 NOTE — TELEPHONE ENCOUNTER
Provider:  Dr. Farnsworth  Surgery/Procedure:  KLAUDIA  Surgery/Procedure Date:  NA  Last visit:   Office Visit with Tommie Almeida PA-C (06/20/2022)    Next visit: Appointment with Tommie Almeida PA-C (08/11/2022)       Reason for call:   Requested Prescriptions     Pending Prescriptions Disp Refills   • diclofenac (VOLTAREN) 50 MG EC tablet [Pharmacy Med Name: DICLOFENAC 50MG DR] 60 tablet 0     Sig: TAKE 1 TABLET BY MOUTH 2 (TWO) TIMES A DAY.

## 2022-07-14 NOTE — TELEPHONE ENCOUNTER
Pt called requesting RF on Lyrica as well.    IVIS:  11/18/2021 Pregabalin 75MG 1970 60 30 Sammy Farnsworth [facility] [place]  PHARMACIST GROUP  [facility] 1 12/20/2021 Pregabalin 75MG 1970 60 30 Caren Jones [facility]  PHARMACIST GROUP  [place] [id number]

## 2022-08-11 ENCOUNTER — OFFICE VISIT (OUTPATIENT)
Dept: NEUROSURGERY | Facility: CLINIC | Age: 52
End: 2022-08-11

## 2022-08-11 VITALS
WEIGHT: 165.2 LBS | SYSTOLIC BLOOD PRESSURE: 118 MMHG | BODY MASS INDEX: 26.55 KG/M2 | DIASTOLIC BLOOD PRESSURE: 62 MMHG | TEMPERATURE: 97.1 F | HEIGHT: 66 IN

## 2022-08-11 DIAGNOSIS — R20.0 LEFT ARM NUMBNESS: ICD-10-CM

## 2022-08-11 DIAGNOSIS — M54.12 CERVICAL RADICULOPATHY: Primary | ICD-10-CM

## 2022-08-11 DIAGNOSIS — M75.02 ADHESIVE CAPSULITIS OF LEFT SHOULDER: ICD-10-CM

## 2022-08-11 PROCEDURE — 99214 OFFICE O/P EST MOD 30 MIN: CPT | Performed by: PHYSICIAN ASSISTANT

## 2022-08-11 RX ORDER — LOSARTAN POTASSIUM 25 MG/1
TABLET ORAL
COMMUNITY
Start: 2022-06-20

## 2022-08-11 RX ORDER — AMITRIPTYLINE HYDROCHLORIDE 25 MG/1
TABLET, FILM COATED ORAL
COMMUNITY
Start: 2022-08-01

## 2022-08-11 NOTE — PROGRESS NOTES
Patient: Angeles Viera  : 1970    Primary Care Provider: Yolie Valiente PA      Chief Complaint: Neck bilateral shoulder pain and left arm pain    History of Present Illness:       Patient is a nice 52-year-old female who was referred to us secondary to left shoulder pain initially by Dr. Farias.  Patient has adhesive capsulitis on that shoulder.  Patient initially saw Dr. Serrano with an MRI that was suggestive of chronic degenerative changes.  Patient was referred to me 90 days later for a reevaluation.  Patient was having then bilateral shoulder pain running through this trapezius and suprascapular area up into the base of her neck.  Patient was then referred to get a myelogram as well as an EMG.    EMG was not revealing for any carpal tunnel or peripheral nerve entrapment but does show a chronic C6 radiculopathy on the left.    Myelography was consistent with multilevel degenerative changes that look to be bony and chronic.    Patient has a right C4-5 neuroforaminal narrowing as well as a left C6-7 foraminal narrowing and a large anterior osteophyte at C5-6.    Upon further discussion patient does have diabetes and has an A1c that is well over 10 currently.  I have encouraged her to get this down which could help some of her radicular complaints and pain.  Patient does have lower extremity cramping which I think is likely result of her diabetes.    Had a discussion with Dr. Farnsworth about options for her surgically.  Everything leans toward a chronic pressure and we would recommend further work-up with pain management possible risotto me/RFA's for the predominantly neck pain that she does have.  Patient really lacks radicular complaints other than intermittent numbness of the left hand.  Dr. Farnsworth really was not there is gastric about offering surgery secondary to her current symptoms and A1c.    Review of Systems   Constitutional: Positive for fatigue. Negative for activity change, appetite change,  chills, diaphoresis, fever and unexpected weight change.   HENT: Negative for congestion, dental problem, drooling, ear discharge, ear pain, facial swelling, hearing loss, mouth sores, nosebleeds, postnasal drip, rhinorrhea, sinus pressure, sinus pain, sneezing, sore throat, tinnitus, trouble swallowing and voice change.    Eyes: Negative for photophobia, pain, discharge, redness, itching and visual disturbance.   Respiratory: Positive for cough and wheezing. Negative for apnea, choking, chest tightness, shortness of breath and stridor.    Cardiovascular: Negative for chest pain, palpitations and leg swelling.   Gastrointestinal: Positive for constipation. Negative for abdominal distention, abdominal pain, anal bleeding, blood in stool, diarrhea, nausea, rectal pain and vomiting.   Endocrine: Negative for cold intolerance, heat intolerance, polydipsia, polyphagia and polyuria.   Genitourinary: Negative for decreased urine volume, difficulty urinating, dyspareunia, dysuria, enuresis, flank pain, frequency, genital sores, hematuria, menstrual problem, pelvic pain, urgency, vaginal bleeding, vaginal discharge and vaginal pain.   Musculoskeletal: Positive for back pain, myalgias, neck pain and neck stiffness. Negative for arthralgias, gait problem and joint swelling.   Skin: Negative for color change, pallor, rash and wound.   Allergic/Immunologic: Negative for environmental allergies, food allergies and immunocompromised state.   Neurological: Negative for dizziness, tremors, seizures, syncope, facial asymmetry, speech difficulty, weakness, light-headedness, numbness and headaches.   Hematological: Negative for adenopathy. Does not bruise/bleed easily.   Psychiatric/Behavioral: Negative for agitation, behavioral problems, confusion, decreased concentration, dysphoric mood, hallucinations, self-injury, sleep disturbance and suicidal ideas. The patient is not nervous/anxious and is not hyperactive.        Past Medical  "History:     Past Medical History:   Diagnosis Date   • Abdominal hernia    • Bronchitis    • COVID 01/19/2022   • Diabetes mellitus (HCC)     pre diabetic, dieting controlled and using cpd oil   • Hashimoto's disease    • Hypertension        Family History:     Family History   Problem Relation Age of Onset   • Hyperlipidemia Mother    • Diabetes Mother    • Diabetes Father    • Hyperlipidemia Father    • Asthma Brother        Social History:    reports that she has never smoked. She has never used smokeless tobacco. She reports current alcohol use. She reports that she does not use drugs.   SMOKING STATUS: Non-smoker    Surgical History:     Past Surgical History:   Procedure Laterality Date   • APPENDECTOMY     • COLONOSCOPY     • FOOT SURGERY Left    • HERNIA REPAIR      abdominal   • HYSTERECTOMY     • INTERVENTIONAL RADIOLOGY PROCEDURE N/A 7/7/2022    Procedure: IR myelogram cervical spine;  Surgeon: Paolo Franco MD;  Location: [facility] CATH INVASIVE LOCATION;  Service: Interventional Radiology;  Laterality: N/A;   • OTHER SURGICAL HISTORY      MYELOGRAM 07/07/2022 PER DR. FRANCO   • TONSILLECTOMY     • VENTRAL/INCISIONAL HERNIA REPAIR N/A 03/21/2019    Procedure: OPEN INCISIONAL HERNIA REPAIR WITH MESH, BILATERAL SEPERATION OF COMPONENTS;  Surgeon: Daniel Cullen MD;  Location: [facility] OR;  Service: General       Allergies:   Patient has no known allergies.    Physical Exam:    Vital Signs:/62 (BP Location: Right arm, Patient Position: Sitting, Cuff Size: Adult)   Temp 97.1 °F (36.2 °C) (Infrared)   Ht 167.6 cm (66\")   Wt 74.9 kg (165 lb 3.2 oz)   BMI 26.66 kg/m²    BMI: Body mass index is 26.66 kg/m².     GENERAL:           The patient is in no acute distress, and is able to answer all questions appropriately.    Neck:          Supple without lymphadenopathy    Cardiovascular:       Peripheral pulses 2+ at dorsalis pedis and posterior tibialis    Lungs:         Breathing " unlabored    Musculoskeletal:      Tenderness with range of motion of the neck.          strength is 5 out of 5 bilaterally.        Shoulder abduction is 5 out of 5.         Dorsiflexion is 5/5 Bilaterally       Plantarflexion is 5/5 bilaterally       Hip Flexion 5/5 bilaterally.         The patient´s gait is normal without antalgia.    Neurologic:          The patient is alert and oriented by 3.          Pupils are equal and reactive to light.         Visual fields are full.         Extraocular movements are intact without nystagmus.         There is no evidence of central motor drift. No facial droop.  No difficulty with rapid alternating movements.         Sensation is equal bilaterally with no deficit.           Reflexes: 3+ throughout 1+ left tricep and left bicep  No Gonzalez's no clonus no long track signs    CRANIAL NERVES:         Deferred  Medical Decision Making    Data Review:   MRI, myelogram, EMG all reviewed as described in HPI.    Diagnosis:   Chronic neck pain  Adhesive capsulitis left shoulder  Bilateral shoulder pain  Chronic left C6 radiculopathy    Treatment Options:   From a neurosurgical perspective would not offer any surgery with her current A1c.  We would reevaluate after getting a thorough work-up with pain management and getting her A1c down to less than 8.    If she completes ongoing therapies/conservative therapies with pain management and get her A1c down is still is having issues we will be happy to reevaluate.  Patient will call in about 90 days to give us an update.    BMI is >= 25 and <30. (Overweight) The following options were offered after discussion;: weight loss educational material (shared in after visit summary)     Diagnosis Plan   1. Cervical radiculopathy     2. Left arm numbness and pain     3. Adhesive capsulitis of left shoulder

## (undated) DEVICE — ANTIBACTERIAL UNDYED BRAIDED (POLYGLACTIN 910), SYNTHETIC ABSORBABLE SUTURE: Brand: COATED VICRYL

## (undated) DEVICE — CONN TBG Y 6 IN 1 LF STRL

## (undated) DEVICE — SUT SILK 3/0 SH CR8 18IN C013D

## (undated) DEVICE — JACKSON-PRATT 100CC BULB RESERVOIR: Brand: CARDINAL HEALTH

## (undated) DEVICE — GLV SURG SENSICARE MICRO PF LF 7 STRL

## (undated) DEVICE — BNDR ABD PREMIUM/UNIV 10IN 27TO48IN

## (undated) DEVICE — COVER,LIGHT HANDLE,FLX,1/PK: Brand: MEDLINE INDUSTRIES, INC.

## (undated) DEVICE — BNDR ABD 4PANEL 12IN 46 TO 62IN

## (undated) DEVICE — GOWN,PREVENTION PLUS,XXLARGE,STERILE: Brand: MEDLINE

## (undated) DEVICE — JP PERF DRN SIL FLT 10MM FULL: Brand: CARDINAL HEALTH

## (undated) DEVICE — LEX GENERAL ABDOMINAL SPLIT: Brand: MEDLINE INDUSTRIES, INC.

## (undated) DEVICE — 3M™ IOBAN™ 2 ANTIMICROBIAL INCISE DRAPE 6650EZ: Brand: IOBAN™ 2

## (undated) DEVICE — TRY L/P SFTY A/20G

## (undated) DEVICE — SUT ETHLN 2/0 PS 18IN 585H

## (undated) DEVICE — SUT SILK 3/0 TIES 18IN A184H

## (undated) DEVICE — NDL SPINE 22G 31/2IN BLK

## (undated) DEVICE — SUT PDS 1 TP1 48IN Z880G BX/12

## (undated) DEVICE — DRSNG WND BORDR/ADHS NONADHR/GZ LF 4X10IN STRL

## (undated) DEVICE — GLV SURG SENSICARE MICRO PF LF 7.5 STRL

## (undated) DEVICE — SUT SILK 2/0 TIES 18IN A185H